# Patient Record
Sex: FEMALE | Race: WHITE | Employment: UNEMPLOYED | URBAN - NONMETROPOLITAN AREA
[De-identification: names, ages, dates, MRNs, and addresses within clinical notes are randomized per-mention and may not be internally consistent; named-entity substitution may affect disease eponyms.]

---

## 2016-10-14 LAB — EXTERNAL GONORRHEA SCREEN: NEGATIVE

## 2016-11-29 LAB
EXTERNAL ABO GROUPING: NORMAL
EXTERNAL RH FACTOR: POSITIVE

## 2016-12-08 LAB
EXTERNAL HEPATITIS B SURFACE ANTIGEN: NEGATIVE
EXTERNAL HERPES CULTURE: NORMAL
EXTERNAL RUBELLA QUALITATIVE: NORMAL
EXTERNAL SYPHILIS RPR SCREEN: NORMAL

## 2017-01-17 DIAGNOSIS — J06.9 ACUTE URI: ICD-10-CM

## 2017-01-17 RX ORDER — IPRATROPIUM BROMIDE 17 UG/1
AEROSOL, METERED RESPIRATORY (INHALATION)
Qty: 12.9 INHALER | Refills: 3 | Status: SHIPPED | OUTPATIENT
Start: 2017-01-17

## 2017-01-25 ENCOUNTER — TELEPHONE (OUTPATIENT)
Dept: NEUROLOGY | Age: 32
End: 2017-01-25

## 2017-01-30 ENCOUNTER — OFFICE VISIT (OUTPATIENT)
Dept: NEUROLOGY | Age: 32
End: 2017-01-30
Payer: COMMERCIAL

## 2017-01-30 VITALS
SYSTOLIC BLOOD PRESSURE: 110 MMHG | WEIGHT: 138 LBS | HEART RATE: 96 BPM | DIASTOLIC BLOOD PRESSURE: 73 MMHG | BODY MASS INDEX: 22.18 KG/M2 | HEIGHT: 66 IN

## 2017-01-30 DIAGNOSIS — R20.0 NUMBNESS AND TINGLING OF RIGHT ARM AND LEG: Primary | ICD-10-CM

## 2017-01-30 DIAGNOSIS — R20.2 NUMBNESS AND TINGLING OF RIGHT ARM AND LEG: Primary | ICD-10-CM

## 2017-01-30 PROCEDURE — 99215 OFFICE O/P EST HI 40 MIN: CPT | Performed by: PSYCHIATRY & NEUROLOGY

## 2017-01-30 RX ORDER — AMOXICILLIN AND CLAVULANATE POTASSIUM 875; 125 MG/1; MG/1
1 TABLET, FILM COATED ORAL 2 TIMES DAILY
COMMUNITY

## 2017-01-30 RX ORDER — DIPHENHYDRAMINE HCL 25 MG
25 CAPSULE ORAL EVERY 6 HOURS PRN
COMMUNITY

## 2017-03-02 ENCOUNTER — TRANSCRIBE ORDERS (OUTPATIENT)
Dept: ADMINISTRATIVE | Facility: HOSPITAL | Age: 32
End: 2017-03-02

## 2017-03-02 DIAGNOSIS — R00.2 PALPITATIONS: Primary | ICD-10-CM

## 2017-03-08 ENCOUNTER — TELEPHONE (OUTPATIENT)
Dept: NEUROLOGY | Age: 32
End: 2017-03-08

## 2017-03-16 ENCOUNTER — HOSPITAL ENCOUNTER (EMERGENCY)
Facility: HOSPITAL | Age: 32
Discharge: HOME | End: 2017-03-16
Attending: EMERGENCY MEDICINE | Admitting: EMERGENCY MEDICINE

## 2017-03-16 ENCOUNTER — APPOINTMENT (OUTPATIENT)
Dept: GENERAL RADIOLOGY | Facility: HOSPITAL | Age: 32
End: 2017-03-16

## 2017-03-16 VITALS
WEIGHT: 145 LBS | RESPIRATION RATE: 18 BRPM | OXYGEN SATURATION: 99 % | TEMPERATURE: 97.8 F | DIASTOLIC BLOOD PRESSURE: 70 MMHG | HEART RATE: 85 BPM | SYSTOLIC BLOOD PRESSURE: 110 MMHG | BODY MASS INDEX: 23.3 KG/M2 | HEIGHT: 66 IN

## 2017-03-16 DIAGNOSIS — E87.6 HYPOKALEMIA: ICD-10-CM

## 2017-03-16 DIAGNOSIS — R00.2 PALPITATIONS: Primary | ICD-10-CM

## 2017-03-16 LAB
ALBUMIN SERPL-MCNC: 3.4 G/DL (ref 3.5–5)
ALBUMIN/GLOB SERPL: 1.2 G/DL (ref 1.1–2.5)
ALP SERPL-CCNC: 60 U/L (ref 24–120)
ALT SERPL W P-5'-P-CCNC: 30 U/L (ref 0–54)
ANION GAP SERPL CALCULATED.3IONS-SCNC: 6 MMOL/L (ref 4–13)
APTT PPP: 29.1 SECONDS (ref 24.1–34.8)
AST SERPL-CCNC: 30 U/L (ref 7–45)
B-HCG UR QL: POSITIVE
BASOPHILS # BLD AUTO: 0.01 10*3/MM3 (ref 0–0.2)
BASOPHILS NFR BLD AUTO: 0.1 % (ref 0–2)
BILIRUB SERPL-MCNC: 0.3 MG/DL (ref 0.1–1)
BILIRUB UR QL STRIP: NEGATIVE
BUN BLD-MCNC: 5 MG/DL (ref 5–21)
BUN/CREAT SERPL: 11.6 (ref 7–25)
CALCIUM SPEC-SCNC: 8.9 MG/DL (ref 8.4–10.4)
CHLORIDE SERPL-SCNC: 104 MMOL/L (ref 98–110)
CLARITY UR: CLEAR
CO2 SERPL-SCNC: 24 MMOL/L (ref 24–31)
COLOR UR: YELLOW
CREAT BLD-MCNC: 0.43 MG/DL (ref 0.5–1.4)
D DIMER PPP FEU-MCNC: 0.45 MG/L (FEU) (ref 0–0.5)
DEPRECATED RDW RBC AUTO: 46 FL (ref 40–54)
EOSINOPHIL # BLD AUTO: 0.17 10*3/MM3 (ref 0–0.7)
EOSINOPHIL NFR BLD AUTO: 2.5 % (ref 0–4)
ERYTHROCYTE [DISTWIDTH] IN BLOOD BY AUTOMATED COUNT: 13.6 % (ref 12–15)
GFR SERPL CREATININE-BSD FRML MDRD: >150 ML/MIN/1.73
GLOBULIN UR ELPH-MCNC: 2.8 GM/DL
GLUCOSE BLD-MCNC: 81 MG/DL (ref 70–100)
GLUCOSE UR STRIP-MCNC: NEGATIVE MG/DL
HCT VFR BLD AUTO: 34.2 % (ref 37–47)
HGB BLD-MCNC: 11.6 G/DL (ref 12–16)
HGB UR QL STRIP.AUTO: NEGATIVE
IMM GRANULOCYTES # BLD: 0.01 10*3/MM3 (ref 0–0.03)
IMM GRANULOCYTES NFR BLD: 0.1 % (ref 0–5)
INR PPP: 0.9 (ref 0.91–1.09)
INTERNAL NEGATIVE CONTROL: NEGATIVE
INTERNAL POSITIVE CONTROL: POSITIVE
KETONES UR QL STRIP: NEGATIVE
LEUKOCYTE ESTERASE UR QL STRIP.AUTO: NEGATIVE
LYMPHOCYTES # BLD AUTO: 1.4 10*3/MM3 (ref 0.72–4.86)
LYMPHOCYTES NFR BLD AUTO: 20.6 % (ref 15–45)
Lab: ABNORMAL
MCH RBC QN AUTO: 31.4 PG (ref 28–32)
MCHC RBC AUTO-ENTMCNC: 33.9 G/DL (ref 33–36)
MCV RBC AUTO: 92.7 FL (ref 82–98)
MONOCYTES # BLD AUTO: 0.31 10*3/MM3 (ref 0.19–1.3)
MONOCYTES NFR BLD AUTO: 4.6 % (ref 4–12)
NEUTROPHILS # BLD AUTO: 4.88 10*3/MM3 (ref 1.87–8.4)
NEUTROPHILS NFR BLD AUTO: 72.1 % (ref 39–78)
NITRITE UR QL STRIP: NEGATIVE
NT-PROBNP SERPL-MCNC: 57.7 PG/ML (ref 0–450)
PH UR STRIP.AUTO: 6.5 [PH] (ref 5–8)
PLATELET # BLD AUTO: 171 10*3/MM3 (ref 130–400)
PMV BLD AUTO: 10.7 FL (ref 6–12)
POTASSIUM BLD-SCNC: 3.3 MMOL/L (ref 3.5–5.3)
PROT SERPL-MCNC: 6.2 G/DL (ref 6.3–8.7)
PROT UR QL STRIP: NEGATIVE
PROTHROMBIN TIME: 12.4 SECONDS (ref 11.9–14.6)
RBC # BLD AUTO: 3.69 10*6/MM3 (ref 4.2–5.4)
SODIUM BLD-SCNC: 134 MMOL/L (ref 135–145)
SP GR UR STRIP: 1.01 (ref 1–1.03)
TROPONIN I SERPL-MCNC: 0 NG/ML (ref 0–0.07)
UROBILINOGEN UR QL STRIP: NORMAL
WBC NRBC COR # BLD: 6.78 10*3/MM3 (ref 4.8–10.8)

## 2017-03-16 PROCEDURE — 93010 ELECTROCARDIOGRAM REPORT: CPT | Performed by: INTERNAL MEDICINE

## 2017-03-16 PROCEDURE — 81003 URINALYSIS AUTO W/O SCOPE: CPT | Performed by: EMERGENCY MEDICINE

## 2017-03-16 PROCEDURE — 85610 PROTHROMBIN TIME: CPT | Performed by: EMERGENCY MEDICINE

## 2017-03-16 PROCEDURE — 93005 ELECTROCARDIOGRAM TRACING: CPT | Performed by: EMERGENCY MEDICINE

## 2017-03-16 PROCEDURE — 71010 HC CHEST PA OR AP: CPT

## 2017-03-16 PROCEDURE — 84484 ASSAY OF TROPONIN QUANT: CPT

## 2017-03-16 PROCEDURE — 85730 THROMBOPLASTIN TIME PARTIAL: CPT | Performed by: EMERGENCY MEDICINE

## 2017-03-16 PROCEDURE — 80053 COMPREHEN METABOLIC PANEL: CPT | Performed by: EMERGENCY MEDICINE

## 2017-03-16 PROCEDURE — 93005 ELECTROCARDIOGRAM TRACING: CPT

## 2017-03-16 PROCEDURE — 85025 COMPLETE CBC W/AUTO DIFF WBC: CPT | Performed by: EMERGENCY MEDICINE

## 2017-03-16 PROCEDURE — 83880 ASSAY OF NATRIURETIC PEPTIDE: CPT | Performed by: EMERGENCY MEDICINE

## 2017-03-16 PROCEDURE — 85379 FIBRIN DEGRADATION QUANT: CPT | Performed by: EMERGENCY MEDICINE

## 2017-03-16 PROCEDURE — 99284 EMERGENCY DEPT VISIT MOD MDM: CPT

## 2017-03-16 RX ORDER — PRENATAL VIT NO.126/IRON/FOLIC 28MG-0.8MG
1 TABLET ORAL DAILY
COMMUNITY

## 2017-03-16 RX ORDER — SODIUM CHLORIDE 0.9 % (FLUSH) 0.9 %
10 SYRINGE (ML) INJECTION AS NEEDED
Status: DISCONTINUED | OUTPATIENT
Start: 2017-03-16 | End: 2017-03-17 | Stop reason: HOSPADM

## 2017-03-16 RX ORDER — POTASSIUM CHLORIDE 20 MEQ/1
40 TABLET, EXTENDED RELEASE ORAL ONCE
Status: DISCONTINUED | OUTPATIENT
Start: 2017-03-16 | End: 2017-03-16 | Stop reason: SDUPTHER

## 2017-03-16 RX ORDER — POTASSIUM CHLORIDE 750 MG/1
40 CAPSULE, EXTENDED RELEASE ORAL ONCE
Status: DISCONTINUED | OUTPATIENT
Start: 2017-03-16 | End: 2017-03-17 | Stop reason: HOSPADM

## 2017-03-16 NOTE — ED NOTES
Amanda reports that she is 22 weeks pregnant and feels that she has been suffering from heart palpations . She reports blurred vision and shortness of breath with these episodes . She reports that she has worn a holter monitor for this and is unaware of the results.      Raad Zuniga RN  03/16/17 [id number]

## 2017-03-16 NOTE — ED PROVIDER NOTES
Subjective palpitations  History of Present Illness  Amanda is a 32 yo white female who since today with chief complaint of palpitations.  The patient states she was sent by Dr. Brown and she is 22 weeks pregnant.  She has been having palpitations for quite some time and is scheduled for Holter monitor.  Apparently about a year ago she was seen by a cardiologist and she states at that time they went to start her on some new medications.  She however at that time elected not to go on medications.  She tells me that over the last several days the palpitations have gotten worse.  Additionally she states that anxiety makes the palpitations worse.  She was seen by her gynecologist and was subsequently referred here for further evaluation of the palpitations.  She has not seen a cardiologist in approximately a year and she denies ever seeing an electrophysiologist.  Review of Systems   Constitutional: Negative.    HENT: Negative.    Eyes: Negative.    Respiratory: Positive for shortness of breath.    Cardiovascular: Positive for palpitations.   Gastrointestinal: Negative.    Endocrine: Negative.    Genitourinary: Negative.    Musculoskeletal: Negative.    Skin: Negative.    Allergic/Immunologic: Negative.    Neurological: Positive for light-headedness.   Hematological: Negative.    Psychiatric/Behavioral: Negative.    All other systems reviewed and are negative.      Past Medical History   Diagnosis Date   • Basal cell carcinoma of cheek 2016     RIGHT CHEEK   • Herpes    • Hypoglycemia    • Nutcracker esophagus    • Pregnant    • Scarring of skin        Allergies   Allergen Reactions   • Shellfish-Derived Products Anaphylaxis   • Cabbage    • Flagyl [Metronidazole]    • Risperidone And Related Myalgia       Past Surgical History   Procedure Laterality Date   • Mole removal       6   • Breast surgery       augmentation   •  section       2   • Cholecystectomy     • [place] tooth extraction         History  reviewed. No pertinent family history.    Social History     Social History   • Marital status: Single     Spouse name: N/A   • Number of children: N/A   • Years of education: N/A     Social History Main Topics   • Smoking status: Former Smoker     Packs/day: 0.00     Types: Cigarettes   • Smokeless tobacco: None   • Alcohol use No   • Drug use: No   • Sexual activity: Yes     Birth control/ protection: None     Other Topics Concern   • None     Social History Narrative   • None           Objective   Physical Exam   Constitutional: She is oriented to person, place, and time. She appears well-developed and well-nourished.   HENT:   Head: Normocephalic and atraumatic.   Right Ear: External ear normal.   Left Ear: External ear normal.   Nose: Nose normal.   Mouth/Throat: Oropharynx is clear and moist.   Eyes: Conjunctivae and EOM are normal. Pupils are equal, round, and reactive to light. Right eye exhibits no discharge. Left eye exhibits no discharge.   Neck: Normal range of motion. Neck supple. No thyromegaly present.   Cardiovascular: Normal rate, regular rhythm, normal heart sounds and intact distal pulses.  Exam reveals no friction rub.    No murmur heard.  Pulmonary/Chest: Effort normal and breath sounds normal. No respiratory distress.   Abdominal: Soft. Bowel sounds are normal. She exhibits no distension. There is no tenderness.   Musculoskeletal: Normal range of motion. She exhibits no edema or deformity.   Neurological: She is alert and oriented to person, place, and time. She has normal reflexes. No cranial nerve deficit.   Skin: Skin is warm and dry. No rash noted.   Psychiatric: Judgment normal.   Nursing note and vitals reviewed.      Procedures         ED Course  ED Course   Comment By Time   The pt has continued to have palpitations.  However the monitor is not bearing out the symptoms.   My plan is to discharge her with a Holter Monitor.   Meryl Hobbs MD 03/16 2009   I spoke with the patient  again she is concerned that she might die from a heart attack.    I tried to assure her that i don't think that will happen.  I have recommended that she follow up with the EP MD Meryl Hobbs MD 03/16 2045          ddx palpitations: arrhythmia, valvular regurgitation, thyrotoxicosis, anemia, anxiety.        MDM  Number of Diagnoses or Management Options  Hypokalemia: new and requires workup  Palpitations: new and requires workup     Amount and/or Complexity of Data Reviewed  Clinical lab tests: ordered and reviewed  Tests in the radiology section of CPT®: ordered and reviewed    Risk of Complications, Morbidity, and/or Mortality  Presenting problems: moderate  Diagnostic procedures: moderate  Management options: moderate    Patient Progress  Patient progress: stable      Final diagnoses:   Palpitations   Hypokalemia            Meryl Hobbs MD  03/17/17 2120

## 2017-03-16 NOTE — ED NOTES
Amanda reports chest pain and increased dizziness. Dr. Hobbs notified. Orders received.       Raad Zuniga RN  03/16/17 [id number]

## 2017-03-17 NOTE — ED NOTES
PT WAS AMBULATED DOWN THE HALLWAY AND BACK WITH RN WHILE MONITORED BY TELEMETRY BOX. PT REMAINED IN A NORMAL SINUS RHYTHM WITH AN AVERAGE HEART RATE OF 75 BPM DURING. PT COMPLAINED OF INTERMITTENT LIGHTHEADEDNESS THAT COMES AND GOES REGARDLESS OF ACTIVITY.      Ghislaine Gan RN  03/16/17 2023

## 2017-03-17 NOTE — DISCHARGE INSTRUCTIONS
Please bring the Holter back as scheduled.  If your symptoms worsen or you are unable to follow-up with the cardiology group I do recommend that you return to the emergency room medially for reevaluation.  Please call the cardiology group tomorrow and attempt to get an earlier appointment.

## 2017-03-21 ENCOUNTER — HOSPITAL ENCOUNTER (OUTPATIENT)
Dept: CARDIOLOGY | Facility: HOSPITAL | Age: 32
Discharge: HOME | End: 2017-03-21
Attending: OBSTETRICS & GYNECOLOGY | Admitting: OBSTETRICS & GYNECOLOGY

## 2017-03-21 DIAGNOSIS — R00.2 PALPITATIONS: ICD-10-CM

## 2017-03-21 PROCEDURE — 93226 XTRNL ECG REC<48 HR SCAN A/R: CPT

## 2017-03-21 PROCEDURE — 93225 XTRNL ECG REC<48 HRS REC: CPT

## 2017-03-24 PROCEDURE — 93227 XTRNL ECG REC<48 HR R&I: CPT | Performed by: INTERNAL MEDICINE

## 2017-04-13 LAB — EXTERNAL ANTIBODY SCREEN: NEGATIVE

## 2017-06-22 ENCOUNTER — HOSPITAL ENCOUNTER (OUTPATIENT)
Facility: HOSPITAL | Age: 32
Setting detail: OBSERVATION
Discharge: HOME | End: 2017-06-22
Attending: OBSTETRICS & GYNECOLOGY | Admitting: OBSTETRICS & GYNECOLOGY

## 2017-06-22 VITALS
WEIGHT: 167 LBS | RESPIRATION RATE: 18 BRPM | DIASTOLIC BLOOD PRESSURE: 68 MMHG | BODY MASS INDEX: 26.84 KG/M2 | HEIGHT: 66 IN | HEART RATE: 100 BPM | TEMPERATURE: 97.8 F | SYSTOLIC BLOOD PRESSURE: 123 MMHG

## 2017-06-22 LAB
EXTERNAL CHLAMYDIA SCREEN: NEGATIVE
EXTERNAL GROUP B STREP ANTIGEN: NEGATIVE

## 2017-06-22 PROCEDURE — G0378 HOSPITAL OBSERVATION PER HR: HCPCS

## 2017-06-22 PROCEDURE — G0463 HOSPITAL OUTPT CLINIC VISIT: HCPCS

## 2017-06-22 PROCEDURE — 59025 FETAL NON-STRESS TEST: CPT

## 2017-06-22 RX ORDER — ACETAMINOPHEN 325 MG/1
650 TABLET ORAL EVERY 4 HOURS PRN
Status: DISCONTINUED | OUTPATIENT
Start: 2017-06-22 | End: 2017-06-22 | Stop reason: HOSPADM

## 2017-06-22 RX ORDER — ACETAMINOPHEN 325 MG/1
650 TABLET ORAL EVERY 4 HOURS PRN
Status: CANCELLED | OUTPATIENT
Start: 2017-06-22

## 2017-06-22 RX ORDER — LORATADINE 10 MG/1
CAPSULE, LIQUID FILLED ORAL DAILY
COMMUNITY

## 2017-06-22 NOTE — NON STRESS TEST
Amanda PAGAN Kennedy, a  at 36w3d with an DILIP of 2017, by Patient Reported, was seen at [facility] for a nonstress test.    Chief Complaint   Patient presents with   • Non-stress Test     contractions       Interpretation A  Nonstress Test Interpretation A: Reactive (17 : Kaleigh Brown MD)  Comments A: verified per twan washburn/ twan razo (17 : Ani Gillis RN)

## 2017-06-23 PROBLEM — Z34.90 PREGNANCY: Status: ACTIVE | Noted: 2017-06-23

## 2017-07-11 ENCOUNTER — APPOINTMENT (OUTPATIENT)
Dept: PREADMISSION TESTING | Facility: HOSPITAL | Age: 32
End: 2017-07-11

## 2017-07-12 ENCOUNTER — HOSPITAL ENCOUNTER (OUTPATIENT)
Facility: HOSPITAL | Age: 32
Setting detail: OBSERVATION
Discharge: HOME | End: 2017-07-12
Attending: OBSTETRICS & GYNECOLOGY | Admitting: OBSTETRICS & GYNECOLOGY

## 2017-07-12 VITALS — HEIGHT: 66 IN | BODY MASS INDEX: 26.52 KG/M2 | WEIGHT: 165 LBS

## 2017-07-12 PROCEDURE — G0378 HOSPITAL OBSERVATION PER HR: HCPCS

## 2017-07-12 NOTE — NURSING NOTE
Pt presented to labor and delivery for vaginal bleeding, pt stated it started about 0900, a quarter size amount was noted on her pad she wore in, dark brown old blood.

## 2017-07-12 NOTE — NURSING NOTE
Pt up to bathroom to void at 1310, very small amount of dark red blood noted on chux pad, pt voided,new pad placed

## 2017-07-12 NOTE — NURSING NOTE
Pt up to bathroom to void at 1215, very small amount of dark red blood noted on chux pad, new pad placed.

## 2017-07-13 ENCOUNTER — APPOINTMENT (OUTPATIENT)
Dept: PREADMISSION TESTING | Facility: HOSPITAL | Age: 32
End: 2017-07-13

## 2017-07-13 VITALS
DIASTOLIC BLOOD PRESSURE: 74 MMHG | SYSTOLIC BLOOD PRESSURE: 122 MMHG | BODY MASS INDEX: 25.96 KG/M2 | WEIGHT: 165.4 LBS | HEART RATE: 85 BPM | OXYGEN SATURATION: 100 % | RESPIRATION RATE: 20 BRPM | HEIGHT: 67 IN

## 2017-07-13 DIAGNOSIS — O34.211 MATERNAL CARE DUE TO LOW TRANSVERSE UTERINE SCAR FROM PREVIOUS CESAREAN DELIVERY: ICD-10-CM

## 2017-07-13 DIAGNOSIS — O34.211 MATERNAL CARE DUE TO LOW TRANSVERSE UTERINE SCAR FROM PREVIOUS CESAREAN DELIVERY: Primary | ICD-10-CM

## 2017-07-13 LAB
ABO GROUP BLD: NORMAL
ANION GAP SERPL CALCULATED.3IONS-SCNC: 8 MMOL/L (ref 4–13)
BASOPHILS # BLD AUTO: 0.02 10*3/MM3 (ref 0–0.2)
BASOPHILS NFR BLD AUTO: 0.2 % (ref 0–2)
BILIRUB UR QL STRIP: NEGATIVE
BLD GP AB SCN SERPL QL: NEGATIVE
BUN BLD-MCNC: 5 MG/DL (ref 5–21)
BUN/CREAT SERPL: 10.4 (ref 7–25)
CALCIUM SPEC-SCNC: 8.7 MG/DL (ref 8.4–10.4)
CHLORIDE SERPL-SCNC: 106 MMOL/L (ref 98–110)
CLARITY UR: CLEAR
CO2 SERPL-SCNC: 21 MMOL/L (ref 24–31)
COLOR UR: YELLOW
CREAT BLD-MCNC: 0.48 MG/DL (ref 0.5–1.4)
DEPRECATED RDW RBC AUTO: 46.3 FL (ref 40–54)
EOSINOPHIL # BLD AUTO: 0.07 10*3/MM3 (ref 0–0.7)
EOSINOPHIL NFR BLD AUTO: 0.8 % (ref 0–4)
ERYTHROCYTE [DISTWIDTH] IN BLOOD BY AUTOMATED COUNT: 14.2 % (ref 12–15)
GFR SERPL CREATININE-BSD FRML MDRD: >150 ML/MIN/1.73
GLUCOSE BLD-MCNC: 76 MG/DL (ref 70–100)
GLUCOSE UR STRIP-MCNC: NEGATIVE MG/DL
HCT VFR BLD AUTO: 34.3 % (ref 37–47)
HGB BLD-MCNC: 11.1 G/DL (ref 12–16)
HGB UR QL STRIP.AUTO: ABNORMAL
IMM GRANULOCYTES # BLD: 0.03 10*3/MM3 (ref 0–0.03)
IMM GRANULOCYTES NFR BLD: 0.4 % (ref 0–5)
KETONES UR QL STRIP: NEGATIVE
LEUKOCYTE ESTERASE UR QL STRIP.AUTO: NEGATIVE
LYMPHOCYTES # BLD AUTO: 1.76 10*3/MM3 (ref 0.72–4.86)
LYMPHOCYTES NFR BLD AUTO: 20.6 % (ref 15–45)
MCH RBC QN AUTO: 29.2 PG (ref 28–32)
MCHC RBC AUTO-ENTMCNC: 32.4 G/DL (ref 33–36)
MCV RBC AUTO: 90.3 FL (ref 82–98)
MONOCYTES # BLD AUTO: 0.42 10*3/MM3 (ref 0.19–1.3)
MONOCYTES NFR BLD AUTO: 4.9 % (ref 4–12)
NEUTROPHILS # BLD AUTO: 6.26 10*3/MM3 (ref 1.87–8.4)
NEUTROPHILS NFR BLD AUTO: 73.1 % (ref 39–78)
NITRITE UR QL STRIP: NEGATIVE
PH UR STRIP.AUTO: 7 [PH] (ref 5–8)
PLATELET # BLD AUTO: 235 10*3/MM3 (ref 130–400)
PMV BLD AUTO: 11.4 FL (ref 6–12)
POTASSIUM BLD-SCNC: 3.9 MMOL/L (ref 3.5–5.3)
PROT UR QL STRIP: NEGATIVE
RBC # BLD AUTO: 3.8 10*6/MM3 (ref 4.2–5.4)
RH BLD: POSITIVE
SODIUM BLD-SCNC: 135 MMOL/L (ref 135–145)
SP GR UR STRIP: 1.01 (ref 1–1.03)
UROBILINOGEN UR QL STRIP: ABNORMAL
WBC NRBC COR # BLD: 8.56 10*3/MM3 (ref 4.8–10.8)

## 2017-07-13 RX ORDER — CARBOPROST TROMETHAMINE 250 UG/ML
250 INJECTION, SOLUTION INTRAMUSCULAR AS NEEDED
Status: CANCELLED | OUTPATIENT
Start: 2017-07-13

## 2017-07-13 RX ORDER — PROMETHAZINE HYDROCHLORIDE 25 MG/1
12.5 TABLET ORAL EVERY 6 HOURS PRN
Status: CANCELLED | OUTPATIENT
Start: 2017-07-13

## 2017-07-13 RX ORDER — IBUPROFEN 800 MG/1
800 TABLET ORAL EVERY 8 HOURS PRN
Status: CANCELLED | OUTPATIENT
Start: 2017-07-13

## 2017-07-13 RX ORDER — MISOPROSTOL 200 UG/1
800 TABLET ORAL AS NEEDED
Status: CANCELLED | OUTPATIENT
Start: 2017-07-13

## 2017-07-13 RX ORDER — PROMETHAZINE HYDROCHLORIDE 25 MG/ML
12.5 INJECTION, SOLUTION INTRAMUSCULAR; INTRAVENOUS EVERY 6 HOURS PRN
Status: CANCELLED | OUTPATIENT
Start: 2017-07-13

## 2017-07-13 RX ORDER — PROMETHAZINE HYDROCHLORIDE 12.5 MG/1
12.5 SUPPOSITORY RECTAL EVERY 6 HOURS PRN
Status: CANCELLED | OUTPATIENT
Start: 2017-07-13

## 2017-07-13 RX ORDER — SODIUM CHLORIDE 0.9 % (FLUSH) 0.9 %
1-10 SYRINGE (ML) INJECTION AS NEEDED
Status: CANCELLED | OUTPATIENT
Start: 2017-07-13

## 2017-07-13 RX ORDER — METHYLERGONOVINE MALEATE 0.2 MG/ML
200 INJECTION INTRAVENOUS AS NEEDED
Status: CANCELLED | OUTPATIENT
Start: 2017-07-13

## 2017-07-13 RX ORDER — CLONAZEPAM 0.5 MG/1
0.25 TABLET ORAL 4 TIMES DAILY PRN
COMMUNITY
End: 2017-07-17 | Stop reason: HOSPADM

## 2017-07-13 RX ORDER — HYDROCODONE BITARTRATE AND ACETAMINOPHEN 5; 325 MG/1; MG/1
1 TABLET ORAL EVERY 4 HOURS PRN
Status: CANCELLED | OUTPATIENT
Start: 2017-07-13 | End: 2017-07-23

## 2017-07-13 NOTE — DISCHARGE INSTRUCTIONS
DAY OF SURGERY INSTRUCTIONS        YOUR SURGEON: Kevin    PROCEDURE:     DATE OF SURGERY: 2017    ARRIVAL TIME: AS DIRECTED BY OFFICE    DAY OF SURGERY TAKE ONLY THESE MEDICATIONS: NO NOT TAKE ANY MEDICATIONS THE MORNING OF YOUR SURGERY.         BEFORE YOU COME TO THE HOSPITAL  (Pre-op instructions)  • Do not eat, drink, smoke or chew gum after midnight the night before surgery.  This also includes no mints.  • Morning of surgery take only the medicines you have been instructed with a sip of water unless otherwise instructed  by your physician.  • Do not shave, wear makeup or dark nail polish.  • Remove all jewelry including rings.  • Leave anything you consider valuable at home.  • Leave your suitcase in the car until after your surgery.  • Bring the following with you if applicable:  o Picture ID and insurance, Medicare or Medicaid cards  o Co-pay/deductible required by insurance (cash, check, credit card)  o Copy of advance directive, living will or power-of- documents if not brought to PAT  o CPAP or BIPAP mask and tubing  o Relaxation aids (MP3 player, book, magazine)  • Confirm your arrival time with you surgeon the day before your surgery (surgery times are subject to change)  • On the day of surgery check in at registration located at the main entrance of the hospital.       Outpatient Surgery Guidelines, Adult  Outpatient procedures are those for which the person having the procedure is allowed to go home the same day as the procedure. Various procedures are done on an outpatient basis. You should follow some general guidelines if you will be having an outpatient procedure.  LET YOUR HEALTH CARE PROVIDER KNOW ABOUT:  · Any allergies you have.  · All medicines you are taking, including vitamins, herbs, eye drops, creams, and over-the-counter medicines.  · Previous problems you or members of your family have had with the use of anesthetics.  · Any blood disorders you  have.  · Previous surgeries you have had.  · Medical conditions you have.  RISKS AND COMPLICATIONS  Your health care provider will discuss possible risks and complications with you before surgery. Common risks and complications include:    · Problems due to the use of anesthetics.  · Blood loss and replacement (does not apply to minor surgical procedures).  · Temporary increase in pain due to surgery.  · Uncorrected pain or problems that the surgery was meant to correct.  · Infection.  · New damage.  BEFORE THE PROCEDURE  · Ask your health care provider about changing or stopping your regular medicines. You may need to stop taking certain medicines in the days or weeks before the procedure.  · Stop smoking at least 2 weeks before surgery. This lowers your risk for complications during and after surgery. Ask your health care provider for help with this if needed.  · Eat your usual meals and a light supper the day before surgery. Continue fluid intake. Do not drink alcohol.  · Do not eat or drink after midnight the night before your surgery.   · Arrange for someone to take you home and to stay with you for 24 hours after the procedure. Medicine given for your procedure may affect your ability to drive or to care for yourself.  · Call your health care provider's office if you develop an illness or problem that may prevent you from safely having your procedure.  AFTER THE PROCEDURE  After surgery, you will be taken to a recovery area, where your progress will be monitored. If there are no complications, you will be allowed to go home when you are awake, stable, and taking fluids well. You may have numbness around the surgical site. Healing will take some time. You will have tenderness at the surgical site and may have some swelling and bruising. You may also have some nausea.  HOME CARE INSTRUCTIONS  · Do not drive for 24 hours, or as directed by your health care provider. Do not drive while taking prescription pain  medicines.  · Do not drink alcohol for 24 hours.  · Do not make important decisions or sign legal documents for 24 hours.  · You may resume a normal diet and activities as directed.  · Do not lift anything heavier than 10 pounds (4.5 kg) or play contact sports until your health care provider says it is okay.  · Change your bandages (dressings) as directed.  · Only take over-the-counter or prescription medicines as directed by your health care provider.  · Follow up with your health care provider as directed.  SEEK MEDICAL CARE IF:  · You have increased bleeding (more than a small spot) from the surgical site.  · You have redness, swelling, or increasing pain in the wound.  · You see pus coming from the wound.  · You have a fever.  · You notice a bad smell coming from the wound or dressing.  · You feel lightheaded or faint.  · You develop a rash.  · You have trouble breathing.  · You develop allergies.  MAKE SURE YOU:  · Understand these instructions.  · Will watch your condition.  · Will get help right away if you are not doing well or get worse.     This information is not intended to replace advice given to you by your health care provider. Make sure you discuss any questions you have with your health care provider.     Document Released: 09/12/2002 Document Revised: 05/03/2016 Document Reviewed: 05/22/2014  [employer] Interactive Patient Education ©2016 [employer]       Fall Prevention in Hospitals, Adult  As a hospital patient, your condition and the treatments you receive can increase your risk for falls. Some additional risk factors for falls in a hospital include:  · Being in an unfamiliar environment.  · Being on bed rest.  · Your surgery.  · Taking certain medicines.  · Your tubing requirements, such as intravenous (IV) therapy or catheters.  It is important that you learn how to decrease fall risks while at the hospital. Below are important tips that can help prevent falls.  SAFETY TIPS FOR PREVENTING  FALLS  Talk about your risk of falling.  · Ask your health care provider why you are at risk for falling. Is it your medicine, illness, tubing placement, or something else?  · Make a plan with your health care provider to keep you safe from falls.  · Ask your health care provider or pharmacist about side effects of your medicines. Some medicines can make you dizzy or affect your coordination.  Ask for help.  · Ask for help before getting out of bed. You may need to press your call button.  · Ask for assistance in getting safely to the toilet.  · Ask for a walker or cane to be put at your bedside. Ask that most of the side rails on your bed be placed up before your health care provider leaves the room.  · Ask family or friends to sit with you.  · Ask for things that are out of your reach, such as your glasses, hearing aids, telephone, bedside table, or call button.  Follow these tips to avoid falling:  · Stay lying or seated, rather than standing, while waiting for help.  · Wear rubber-soled slippers or shoes whenever you walk in the hospital.  · Avoid quick, sudden movements.  ¨ Change positions slowly.  ¨ Sit on the side of your bed before standing.  ¨ Stand up slowly and wait before you start to walk.  · Let your health care provider know if there is a spill on the floor.  · Pay careful attention to the medical equipment, electrical cords, and tubes around you.  · When you need help, use your call button by your bed or in the bathroom. Wait for one of your health care providers to help you.  · If you feel dizzy or unsure of your footing, return to bed and wait for assistance.  · Avoid being distracted by the TV, telephone, or another person in your room.  · Do not lean or support yourself on rolling objects, such as IV poles or bedside tables.     This information is not intended to replace advice given to you by your health care provider. Make sure you discuss any questions you have with your health care  provider.     Document Released: 12/15/2001 Document Revised: 01/08/2016 Document Reviewed: 08/25/2013  [employer] Interactive Patient Education ©2016 [employer]       Surgical Site Infections FAQs  What is a Surgical Site Infection (SSI)?  A surgical site infection is an infection that occurs after surgery in the part of the body where the surgery took place. Most patients who have surgery do not develop an infection. However, infections develop in about 1 to 3 out of every 100 patients who have surgery.  Some of the common symptoms of a surgical site infection are:  · Redness and pain around the area where you had surgery  · Drainage of cloudy fluid from your surgical wound  · Fever  Can SSIs be treated?  Yes. Most surgical site infections can be treated with antibiotics. The antibiotic given to you depends on the bacteria (germs) causing the infection. Sometimes patients with SSIs also need another surgery to treat the infection.  What are some of the things that hospitals are doing to prevent SSIs?  To prevent SSIs, doctors, nurses, and other healthcare providers:  · Clean their hands and arms up to their elbows with an antiseptic agent just before the surgery.  · Clean their hands with soap and water or an alcohol-based hand rub before and after caring for each patient.  · May remove some of your hair immediately before your surgery using electric clippers if the hair is in the same area where the procedure will occur. They should not shave you with a razor.  · Wear special hair covers, masks, gowns, and gloves during surgery to keep the surgery area clean.  · Give you antibiotics before your surgery starts. In most cases, you should get antibiotics within 60 minutes before the surgery starts and the antibiotics should be stopped within 24 hours after surgery.  · Clean the skin at the site of your surgery with a special soap that kills germs.  What can I do to help prevent SSIs?  Before your surgery:  · Tell  your doctor about other medical problems you may have. Health problems such as allergies, diabetes, and obesity could affect your surgery and your treatment.  · Quit smoking. Patients who smoke get more infections. Talk to your doctor about how you can quit before your surgery.  · Do not shave near where you will have surgery. Shaving with a razor can irritate your skin and make it easier to develop an infection.  At the time of your surgery:  · Speak up if someone tries to shave you with a razor before surgery. Ask why you need to be shaved and talk with your surgeon if you have any concerns.  · Ask if you will get antibiotics before surgery.  After your surgery:  · Make sure that your healthcare providers clean their hands before examining you, either with soap and water or an alcohol-based hand rub.  · If you do not see your providers clean their hands, please ask them to do so.  · Family and friends who visit you should not touch the surgical wound or dressings.  · Family and friends should clean their hands with soap and water or an alcohol-based hand rub before and after visiting you. If you do not see them clean their hands, ask them to clean their hands.  What do I need to do when I go home from the hospital?  · Before you go home, your doctor or nurse should explain everything you need to know about taking care of your wound. Make sure you understand how to care for your wound before you leave the hospital.  · Always clean your hands before and after caring for your wound.  · Before you go home, make sure you know who to contact if you have questions or problems after you get home.  · If you have any symptoms of an infection, such as redness and pain at the surgery site, drainage, or fever, call your doctor immediately.  If you have additional questions, please ask your doctor or nurse.  Developed and co-sponsored by The Society for Healthcare Epidemiology of [place] (SHEA); Infectious Diseases Society of  [place] (IDSA); American Hospital Association; Association for Professionals in Infection Control and Epidemiology (APIC); Centers for Disease Control and Prevention (CDC); and The Joint Commission.     This information is not intended to replace advice given to you by your health care provider. Make sure you discuss any questions you have with your health care provider.     Document Released: 12/23/2014 Document Revised: 01/08/2016 Document Reviewed: 03/02/2016  [employer] Interactive Patient Education ©2016 [employer]       [facility]  CHG 4% Patient Instruction Sheet    Preparing the Skin Before Surgery  Preparing or “prepping” skin before surgery can reduce the risk of infection at the surgical site. To make the process easier,[facility] has chosen 4% Chlorhexidine Gluconate (CHG) antiseptic solution.   The steps below outline the prepping process and should be carefully followed.                                                                                                                                                      Prep the skin at the following time(s):                                                      We recommend you shower the night before surgery, and again the morning of surgery with the 4% CHG antiseptic solution using half of the bottle and a cloth each time.  Dress in clean clothes/sleepwear after showering.  See instructions below for application.          Do not apply any lotions or moisturizers.       Do not shave the area to be prepped for at least 2 days prior to surgery.    Clipping the hair may be done immediately prior to your surgery at the hospital    if needed.    Directions:  Thoroughly rinse your body with water.  Apply 4% CHG to a cloth and wash skin gently, paying special attention to the operative site.  Rinse again thoroughly.  Once you have begun using this product do not apply anything else to your skin. If itching or redness persists, rinse affected areas and  discontinue use.    When using this product:  • Keep out of eyes, ears, and mouth.  • If solution should contact these areas, rinse out promptly and thoroughly with water.  • For external use only.  • Do not use in genital area, on your face or head.      PATIENT/FAMILY/RESPONSIBLE PARTY VERBALIZES UNDERSTANDING OF ABOVE EDUCATION.  COPY OF PAIN SCALE GIVEN AND REVIEWED WITH VERBALIZED UNDERSTANDING.

## 2017-07-14 ENCOUNTER — ANESTHESIA EVENT (OUTPATIENT)
Dept: LABOR AND DELIVERY | Facility: HOSPITAL | Age: 32
End: 2017-07-14

## 2017-07-14 ENCOUNTER — HOSPITAL ENCOUNTER (INPATIENT)
Facility: HOSPITAL | Age: 32
LOS: 3 days | Discharge: HOME | End: 2017-07-17
Attending: OBSTETRICS & GYNECOLOGY | Admitting: OBSTETRICS & GYNECOLOGY

## 2017-07-14 ENCOUNTER — ANESTHESIA (OUTPATIENT)
Dept: LABOR AND DELIVERY | Facility: HOSPITAL | Age: 32
End: 2017-07-14

## 2017-07-14 PROBLEM — O34.211 MATERNAL CARE DUE TO LOW TRANSVERSE UTERINE SCAR FROM PREVIOUS CESAREAN DELIVERY: Status: ACTIVE | Noted: 2017-07-14

## 2017-07-14 LAB — GLUCOSE BLDC GLUCOMTR-MCNC: 104 MG/DL (ref 70–130)

## 2017-07-14 PROCEDURE — 25010000003 CEFAZOLIN PER 500 MG: Performed by: OBSTETRICS & GYNECOLOGY

## 2017-07-14 PROCEDURE — 25010000002 HYDROMORPHONE PER 4 MG: Performed by: NURSE ANESTHETIST, CERTIFIED REGISTERED

## 2017-07-14 PROCEDURE — 88307 TISSUE EXAM BY PATHOLOGIST: CPT | Performed by: OBSTETRICS & GYNECOLOGY

## 2017-07-14 PROCEDURE — 25010000002 KETOROLAC TROMETHAMINE PER 15 MG: Performed by: OBSTETRICS & GYNECOLOGY

## 2017-07-14 PROCEDURE — 51703 INSERT BLADDER CATH COMPLEX: CPT

## 2017-07-14 PROCEDURE — 25010000002 ONDANSETRON PER 1 MG: Performed by: NURSE ANESTHETIST, CERTIFIED REGISTERED

## 2017-07-14 PROCEDURE — 25010000002 NALBUPHINE PER 10 MG: Performed by: NURSE ANESTHETIST, CERTIFIED REGISTERED

## 2017-07-14 PROCEDURE — 25010000002 DEXAMETHASONE PER 1 MG: Performed by: NURSE ANESTHETIST, CERTIFIED REGISTERED

## 2017-07-14 PROCEDURE — 94799 UNLISTED PULMONARY SVC/PX: CPT

## 2017-07-14 PROCEDURE — 25010000002 BUTORPHANOL PER 1 MG: Performed by: NURSE ANESTHETIST, CERTIFIED REGISTERED

## 2017-07-14 PROCEDURE — 25010000003 CEFAZOLIN PER 500 MG: Performed by: NURSE ANESTHETIST, CERTIFIED REGISTERED

## 2017-07-14 PROCEDURE — G0463 HOSPITAL OUTPT CLINIC VISIT: HCPCS

## 2017-07-14 PROCEDURE — 25010000002 CEFEPIME: Performed by: OBSTETRICS & GYNECOLOGY

## 2017-07-14 PROCEDURE — 25010000002 MIDAZOLAM PER 1 MG: Performed by: NURSE ANESTHETIST, CERTIFIED REGISTERED

## 2017-07-14 PROCEDURE — 82962 GLUCOSE BLOOD TEST: CPT

## 2017-07-14 PROCEDURE — C1765 ADHESION BARRIER: HCPCS | Performed by: OBSTETRICS & GYNECOLOGY

## 2017-07-14 PROCEDURE — 25010000002 KETOROLAC TROMETHAMINE PER 15 MG: Performed by: NURSE ANESTHETIST, CERTIFIED REGISTERED

## 2017-07-14 RX ORDER — NALBUPHINE HCL 10 MG/ML
2.5 AMPUL (ML) INJECTION EVERY 4 HOURS PRN
Status: DISPENSED | OUTPATIENT
Start: 2017-07-14 | End: 2017-07-15

## 2017-07-14 RX ORDER — PROMETHAZINE HYDROCHLORIDE 25 MG/1
12.5 TABLET ORAL EVERY 6 HOURS PRN
Status: DISCONTINUED | OUTPATIENT
Start: 2017-07-14 | End: 2017-07-14 | Stop reason: HOSPADM

## 2017-07-14 RX ORDER — SODIUM CHLORIDE, SODIUM LACTATE, POTASSIUM CHLORIDE, CALCIUM CHLORIDE 600; 310; 30; 20 MG/100ML; MG/100ML; MG/100ML; MG/100ML
125 INJECTION, SOLUTION INTRAVENOUS CONTINUOUS
Status: DISCONTINUED | OUTPATIENT
Start: 2017-07-14 | End: 2017-07-14

## 2017-07-14 RX ORDER — BUTORPHANOL TARTRATE 1 MG/ML
0.5 INJECTION, SOLUTION INTRAMUSCULAR; INTRAVENOUS EVERY 6 HOURS PRN
Status: DISCONTINUED | OUTPATIENT
Start: 2017-07-14 | End: 2017-07-14

## 2017-07-14 RX ORDER — ONDANSETRON 2 MG/ML
4 INJECTION INTRAMUSCULAR; INTRAVENOUS EVERY 6 HOURS PRN
Status: DISCONTINUED | OUTPATIENT
Start: 2017-07-14 | End: 2017-07-17 | Stop reason: HOSPADM

## 2017-07-14 RX ORDER — PROMETHAZINE HYDROCHLORIDE 12.5 MG/1
12.5 SUPPOSITORY RECTAL EVERY 6 HOURS PRN
Status: DISCONTINUED | OUTPATIENT
Start: 2017-07-14 | End: 2017-07-17 | Stop reason: HOSPADM

## 2017-07-14 RX ORDER — SODIUM CHLORIDE 0.9 % (FLUSH) 0.9 %
1-10 SYRINGE (ML) INJECTION AS NEEDED
Status: DISCONTINUED | OUTPATIENT
Start: 2017-07-14 | End: 2017-07-14 | Stop reason: HOSPADM

## 2017-07-14 RX ORDER — PROMETHAZINE HYDROCHLORIDE 25 MG/ML
12.5 INJECTION, SOLUTION INTRAMUSCULAR; INTRAVENOUS EVERY 6 HOURS PRN
Status: DISCONTINUED | OUTPATIENT
Start: 2017-07-14 | End: 2017-07-14 | Stop reason: HOSPADM

## 2017-07-14 RX ORDER — LAMOTRIGINE 100 MG/1
100 TABLET ORAL EVERY 12 HOURS SCHEDULED
Status: DISCONTINUED | OUTPATIENT
Start: 2017-07-14 | End: 2017-07-17 | Stop reason: HOSPADM

## 2017-07-14 RX ORDER — PRENATAL VIT/IRON FUM/FOLIC AC 27MG-0.8MG
1 TABLET ORAL DAILY
Status: DISCONTINUED | OUTPATIENT
Start: 2017-07-14 | End: 2017-07-17 | Stop reason: HOSPADM

## 2017-07-14 RX ORDER — ONDANSETRON 2 MG/ML
4 INJECTION INTRAMUSCULAR; INTRAVENOUS EVERY 6 HOURS PRN
Status: DISCONTINUED | OUTPATIENT
Start: 2017-07-14 | End: 2017-07-14 | Stop reason: SDUPTHER

## 2017-07-14 RX ORDER — OXYCODONE AND ACETAMINOPHEN 7.5; 325 MG/1; MG/1
1 TABLET ORAL EVERY 4 HOURS PRN
Status: DISCONTINUED | OUTPATIENT
Start: 2017-07-14 | End: 2017-07-14

## 2017-07-14 RX ORDER — OXYCODONE AND ACETAMINOPHEN 7.5; 325 MG/1; MG/1
2 TABLET ORAL EVERY 4 HOURS PRN
Status: ACTIVE | OUTPATIENT
Start: 2017-07-14 | End: 2017-07-15

## 2017-07-14 RX ORDER — SODIUM CHLORIDE 0.9 % (FLUSH) 0.9 %
1-10 SYRINGE (ML) INJECTION AS NEEDED
Status: DISCONTINUED | OUTPATIENT
Start: 2017-07-14 | End: 2017-07-17 | Stop reason: HOSPADM

## 2017-07-14 RX ORDER — FAMOTIDINE 10 MG/ML
20 INJECTION, SOLUTION INTRAVENOUS ONCE AS NEEDED
Status: DISCONTINUED | OUTPATIENT
Start: 2017-07-14 | End: 2017-07-14

## 2017-07-14 RX ORDER — IBUPROFEN 800 MG/1
800 TABLET ORAL EVERY 8 HOURS PRN
Status: DISCONTINUED | OUTPATIENT
Start: 2017-07-14 | End: 2017-07-14 | Stop reason: HOSPADM

## 2017-07-14 RX ORDER — OXYCODONE HYDROCHLORIDE AND ACETAMINOPHEN 5; 325 MG/1; MG/1
2 TABLET ORAL EVERY 6 HOURS PRN
Status: DISCONTINUED | OUTPATIENT
Start: 2017-07-15 | End: 2017-07-17 | Stop reason: HOSPADM

## 2017-07-14 RX ORDER — ONDANSETRON 4 MG/1
4 TABLET, FILM COATED ORAL EVERY 6 HOURS PRN
Status: DISCONTINUED | OUTPATIENT
Start: 2017-07-14 | End: 2017-07-17 | Stop reason: HOSPADM

## 2017-07-14 RX ORDER — DIPHENHYDRAMINE HCL 25 MG
25 CAPSULE ORAL EVERY 4 HOURS PRN
Status: DISCONTINUED | OUTPATIENT
Start: 2017-07-14 | End: 2017-07-17 | Stop reason: HOSPADM

## 2017-07-14 RX ORDER — CETIRIZINE HYDROCHLORIDE 10 MG/1
10 TABLET ORAL DAILY
Status: COMPLETED | OUTPATIENT
Start: 2017-07-14 | End: 2017-07-14

## 2017-07-14 RX ORDER — NALBUPHINE HCL 10 MG/ML
2.5 AMPUL (ML) INJECTION EVERY 4 HOURS PRN
Status: DISCONTINUED | OUTPATIENT
Start: 2017-07-14 | End: 2017-07-14

## 2017-07-14 RX ORDER — OXYCODONE AND ACETAMINOPHEN 7.5; 325 MG/1; MG/1
2 TABLET ORAL EVERY 4 HOURS PRN
Status: DISCONTINUED | OUTPATIENT
Start: 2017-07-14 | End: 2017-07-14

## 2017-07-14 RX ORDER — DIPHENHYDRAMINE HYDROCHLORIDE 50 MG/ML
25 INJECTION INTRAMUSCULAR; INTRAVENOUS EVERY 4 HOURS PRN
Status: DISCONTINUED | OUTPATIENT
Start: 2017-07-14 | End: 2017-07-17 | Stop reason: HOSPADM

## 2017-07-14 RX ORDER — CLINDAMYCIN PHOSPHATE 900 MG/50ML
900 INJECTION INTRAVENOUS EVERY 8 HOURS
Status: DISCONTINUED | OUTPATIENT
Start: 2017-07-14 | End: 2017-07-15

## 2017-07-14 RX ORDER — KETOROLAC TROMETHAMINE 30 MG/ML
30 INJECTION, SOLUTION INTRAMUSCULAR; INTRAVENOUS EVERY 6 HOURS
Status: DISPENSED | OUTPATIENT
Start: 2017-07-14 | End: 2017-07-15

## 2017-07-14 RX ORDER — CEFAZOLIN SODIUM 1 G/3ML
INJECTION, POWDER, FOR SOLUTION INTRAMUSCULAR; INTRAVENOUS AS NEEDED
Status: DISCONTINUED | OUTPATIENT
Start: 2017-07-14 | End: 2017-07-14 | Stop reason: SURG

## 2017-07-14 RX ORDER — OXYCODONE HYDROCHLORIDE AND ACETAMINOPHEN 5; 325 MG/1; MG/1
1 TABLET ORAL EVERY 6 HOURS PRN
Status: DISCONTINUED | OUTPATIENT
Start: 2017-07-15 | End: 2017-07-17 | Stop reason: HOSPADM

## 2017-07-14 RX ORDER — OXYCODONE HYDROCHLORIDE AND ACETAMINOPHEN 5; 325 MG/1; MG/1
2 TABLET ORAL EVERY 6 HOURS PRN
Status: DISCONTINUED | OUTPATIENT
Start: 2017-07-15 | End: 2017-07-14

## 2017-07-14 RX ORDER — OXYCODONE AND ACETAMINOPHEN 7.5; 325 MG/1; MG/1
1 TABLET ORAL EVERY 4 HOURS PRN
Status: ACTIVE | OUTPATIENT
Start: 2017-07-14 | End: 2017-07-15

## 2017-07-14 RX ORDER — CARBOPROST TROMETHAMINE 250 UG/ML
250 INJECTION, SOLUTION INTRAMUSCULAR AS NEEDED
Status: DISCONTINUED | OUTPATIENT
Start: 2017-07-14 | End: 2017-07-14 | Stop reason: HOSPADM

## 2017-07-14 RX ORDER — METHYLERGONOVINE MALEATE 0.2 MG/ML
200 INJECTION INTRAVENOUS AS NEEDED
Status: DISCONTINUED | OUTPATIENT
Start: 2017-07-14 | End: 2017-07-14 | Stop reason: HOSPADM

## 2017-07-14 RX ORDER — ONDANSETRON 4 MG/1
4 TABLET, ORALLY DISINTEGRATING ORAL EVERY 6 HOURS PRN
Status: DISCONTINUED | OUTPATIENT
Start: 2017-07-14 | End: 2017-07-17 | Stop reason: HOSPADM

## 2017-07-14 RX ORDER — NALOXONE HCL 0.4 MG/ML
0.4 VIAL (ML) INJECTION
Status: ACTIVE | OUTPATIENT
Start: 2017-07-14 | End: 2017-07-15

## 2017-07-14 RX ORDER — SODIUM CHLORIDE, SODIUM LACTATE, POTASSIUM CHLORIDE, CALCIUM CHLORIDE 600; 310; 30; 20 MG/100ML; MG/100ML; MG/100ML; MG/100ML
125 INJECTION, SOLUTION INTRAVENOUS CONTINUOUS
Status: DISCONTINUED | OUTPATIENT
Start: 2017-07-14 | End: 2017-07-17 | Stop reason: HOSPADM

## 2017-07-14 RX ORDER — METHYLERGONOVINE MALEATE 0.2 MG/ML
200 INJECTION INTRAVENOUS
Status: ACTIVE | OUTPATIENT
Start: 2017-07-14 | End: 2017-07-15

## 2017-07-14 RX ORDER — MISOPROSTOL 200 UG/1
800 TABLET ORAL AS NEEDED
Status: DISCONTINUED | OUTPATIENT
Start: 2017-07-14 | End: 2017-07-14 | Stop reason: HOSPADM

## 2017-07-14 RX ORDER — KETOROLAC TROMETHAMINE 30 MG/ML
INJECTION, SOLUTION INTRAMUSCULAR; INTRAVENOUS AS NEEDED
Status: DISCONTINUED | OUTPATIENT
Start: 2017-07-14 | End: 2017-07-14 | Stop reason: SURG

## 2017-07-14 RX ORDER — ONDANSETRON 2 MG/ML
INJECTION INTRAMUSCULAR; INTRAVENOUS AS NEEDED
Status: DISCONTINUED | OUTPATIENT
Start: 2017-07-14 | End: 2017-07-14 | Stop reason: SURG

## 2017-07-14 RX ORDER — PROMETHAZINE HYDROCHLORIDE 25 MG/ML
12.5 INJECTION, SOLUTION INTRAMUSCULAR; INTRAVENOUS EVERY 6 HOURS PRN
Status: DISCONTINUED | OUTPATIENT
Start: 2017-07-14 | End: 2017-07-17 | Stop reason: HOSPADM

## 2017-07-14 RX ORDER — IBUPROFEN 800 MG/1
800 TABLET ORAL EVERY 8 HOURS PRN
Status: DISCONTINUED | OUTPATIENT
Start: 2017-07-15 | End: 2017-07-17 | Stop reason: HOSPADM

## 2017-07-14 RX ORDER — CARBOPROST TROMETHAMINE 250 UG/ML
250 INJECTION, SOLUTION INTRAMUSCULAR
Status: ACTIVE | OUTPATIENT
Start: 2017-07-14 | End: 2017-07-15

## 2017-07-14 RX ORDER — DOCUSATE SODIUM 100 MG/1
100 CAPSULE, LIQUID FILLED ORAL 2 TIMES DAILY PRN
Status: DISCONTINUED | OUTPATIENT
Start: 2017-07-14 | End: 2017-07-17 | Stop reason: HOSPADM

## 2017-07-14 RX ORDER — BUPIVACAINE HYDROCHLORIDE 7.5 MG/ML
INJECTION, SOLUTION EPIDURAL; RETROBULBAR AS NEEDED
Status: DISCONTINUED | OUTPATIENT
Start: 2017-07-14 | End: 2017-07-14 | Stop reason: SURG

## 2017-07-14 RX ORDER — OXYTOCIN/RINGER'S LACTATE 20/1000 ML
PLASTIC BAG, INJECTION (ML) INTRAVENOUS
Status: COMPLETED
Start: 2017-07-14 | End: 2017-07-14

## 2017-07-14 RX ORDER — MISOPROSTOL 200 UG/1
800 TABLET ORAL ONCE AS NEEDED
Status: ACTIVE | OUTPATIENT
Start: 2017-07-14 | End: 2017-07-15

## 2017-07-14 RX ORDER — SODIUM CHLORIDE, SODIUM LACTATE, POTASSIUM CHLORIDE, CALCIUM CHLORIDE 600; 310; 30; 20 MG/100ML; MG/100ML; MG/100ML; MG/100ML
INJECTION, SOLUTION INTRAVENOUS CONTINUOUS PRN
Status: DISCONTINUED | OUTPATIENT
Start: 2017-07-14 | End: 2017-07-14 | Stop reason: SURG

## 2017-07-14 RX ORDER — OXYCODONE HYDROCHLORIDE AND ACETAMINOPHEN 5; 325 MG/1; MG/1
1 TABLET ORAL EVERY 6 HOURS PRN
Status: DISCONTINUED | OUTPATIENT
Start: 2017-07-15 | End: 2017-07-14

## 2017-07-14 RX ORDER — OXYTOCIN/RINGER'S LACTATE 20/1000 ML
125 PLASTIC BAG, INJECTION (ML) INTRAVENOUS CONTINUOUS PRN
Status: COMPLETED | OUTPATIENT
Start: 2017-07-14 | End: 2017-07-14

## 2017-07-14 RX ORDER — PROMETHAZINE HYDROCHLORIDE 12.5 MG/1
12.5 SUPPOSITORY RECTAL EVERY 6 HOURS PRN
Status: DISCONTINUED | OUTPATIENT
Start: 2017-07-14 | End: 2017-07-14 | Stop reason: HOSPADM

## 2017-07-14 RX ORDER — TRISODIUM CITRATE DIHYDRATE AND CITRIC ACID MONOHYDRATE 500; 334 MG/5ML; MG/5ML
15 SOLUTION ORAL ONCE
Status: COMPLETED | OUTPATIENT
Start: 2017-07-14 | End: 2017-07-14

## 2017-07-14 RX ORDER — BISACODYL 10 MG
10 SUPPOSITORY, RECTAL RECTAL DAILY PRN
Status: DISCONTINUED | OUTPATIENT
Start: 2017-07-14 | End: 2017-07-17 | Stop reason: HOSPADM

## 2017-07-14 RX ORDER — PROMETHAZINE HYDROCHLORIDE 25 MG/1
25 TABLET ORAL EVERY 6 HOURS PRN
Status: DISCONTINUED | OUTPATIENT
Start: 2017-07-14 | End: 2017-07-17 | Stop reason: HOSPADM

## 2017-07-14 RX ORDER — MIDAZOLAM HYDROCHLORIDE 1 MG/ML
INJECTION INTRAMUSCULAR; INTRAVENOUS AS NEEDED
Status: DISCONTINUED | OUTPATIENT
Start: 2017-07-14 | End: 2017-07-14 | Stop reason: SURG

## 2017-07-14 RX ORDER — DEXAMETHASONE SODIUM PHOSPHATE 4 MG/ML
INJECTION, SOLUTION INTRA-ARTICULAR; INTRALESIONAL; INTRAMUSCULAR; INTRAVENOUS; SOFT TISSUE AS NEEDED
Status: DISCONTINUED | OUTPATIENT
Start: 2017-07-14 | End: 2017-07-14 | Stop reason: SURG

## 2017-07-14 RX ORDER — NALOXONE HCL 0.4 MG/ML
0.4 VIAL (ML) INJECTION
Status: DISCONTINUED | OUTPATIENT
Start: 2017-07-14 | End: 2017-07-14

## 2017-07-14 RX ORDER — SODIUM CHLORIDE 0.9 % (FLUSH) 0.9 %
1-10 SYRINGE (ML) INJECTION AS NEEDED
Status: DISCONTINUED | OUTPATIENT
Start: 2017-07-14 | End: 2017-07-14

## 2017-07-14 RX ORDER — HYDROCODONE BITARTRATE AND ACETAMINOPHEN 5; 325 MG/1; MG/1
1 TABLET ORAL EVERY 4 HOURS PRN
Status: DISCONTINUED | OUTPATIENT
Start: 2017-07-14 | End: 2017-07-14 | Stop reason: HOSPADM

## 2017-07-14 RX ORDER — BUTORPHANOL TARTRATE 1 MG/ML
0.5 INJECTION, SOLUTION INTRAMUSCULAR; INTRAVENOUS EVERY 6 HOURS PRN
Status: DISCONTINUED | OUTPATIENT
Start: 2017-07-14 | End: 2017-07-17 | Stop reason: HOSPADM

## 2017-07-14 RX ORDER — BISACODYL 5 MG/1
10 TABLET, DELAYED RELEASE ORAL DAILY PRN
Status: DISCONTINUED | OUTPATIENT
Start: 2017-07-14 | End: 2017-07-17 | Stop reason: HOSPADM

## 2017-07-14 RX ADMIN — Medication 125 ML/HR: at 11:06

## 2017-07-14 RX ADMIN — CEFAZOLIN 3 G: 1 INJECTION, POWDER, FOR SOLUTION INTRAVENOUS at 09:09

## 2017-07-14 RX ADMIN — OXYTOCIN 1 UNITS/MIN: 10 INJECTION INTRAVENOUS at 09:04

## 2017-07-14 RX ADMIN — KETOROLAC TROMETHAMINE 30 MG: 30 INJECTION, SOLUTION INTRAMUSCULAR at 09:37

## 2017-07-14 RX ADMIN — MIDAZOLAM HYDROCHLORIDE 2 MG: 1 INJECTION, SOLUTION INTRAMUSCULAR; INTRAVENOUS at 09:04

## 2017-07-14 RX ADMIN — BUPIVACAINE HYDROCHLORIDE 1.4 ML: 7.5 INJECTION, SOLUTION EPIDURAL; RETROBULBAR at 08:48

## 2017-07-14 RX ADMIN — CEFEPIME 2 G: 2 INJECTION, POWDER, FOR SOLUTION INTRAVENOUS at 23:13

## 2017-07-14 RX ADMIN — SODIUM CITRATE AND CITRIC ACID MONOHYDRATE 15 ML: 500; 334 SOLUTION ORAL at 07:20

## 2017-07-14 RX ADMIN — HYDROMORPHONE HYDROCHLORIDE 900 MCG: 1 INJECTION, SOLUTION INTRAMUSCULAR; INTRAVENOUS; SUBCUTANEOUS at 09:08

## 2017-07-14 RX ADMIN — SODIUM CHLORIDE, POTASSIUM CHLORIDE, SODIUM LACTATE AND CALCIUM CHLORIDE 1000 ML: 600; 310; 30; 20 INJECTION, SOLUTION INTRAVENOUS at 06:14

## 2017-07-14 RX ADMIN — SODIUM CHLORIDE, POTASSIUM CHLORIDE, SODIUM LACTATE AND CALCIUM CHLORIDE 125 ML/HR: 600; 310; 30; 20 INJECTION, SOLUTION INTRAVENOUS at 18:07

## 2017-07-14 RX ADMIN — NALBUPHINE HYDROCHLORIDE 2.5 MG: 10 INJECTION, SOLUTION INTRAMUSCULAR; INTRAVENOUS; SUBCUTANEOUS at 18:07

## 2017-07-14 RX ADMIN — NALBUPHINE HYDROCHLORIDE 2.5 MG: 10 INJECTION, SOLUTION INTRAMUSCULAR; INTRAVENOUS; SUBCUTANEOUS at 12:41

## 2017-07-14 RX ADMIN — ONDANSETRON HYDROCHLORIDE 4 MG: 2 SOLUTION INTRAMUSCULAR; INTRAVENOUS at 09:07

## 2017-07-14 RX ADMIN — CLINDAMYCIN PHOSPHATE 900 MG: 18 INJECTION, SOLUTION INTRAVENOUS at 11:17

## 2017-07-14 RX ADMIN — KETOROLAC TROMETHAMINE 30 MG: 30 INJECTION, SOLUTION INTRAMUSCULAR at 23:13

## 2017-07-14 RX ADMIN — KETOROLAC TROMETHAMINE 30 MG: 30 INJECTION, SOLUTION INTRAMUSCULAR at 18:07

## 2017-07-14 RX ADMIN — HYDROMORPHONE HYDROCHLORIDE 100 MCG: 1 INJECTION, SOLUTION INTRAMUSCULAR; INTRAVENOUS; SUBCUTANEOUS at 08:48

## 2017-07-14 RX ADMIN — SODIUM CHLORIDE, POTASSIUM CHLORIDE, SODIUM LACTATE AND CALCIUM CHLORIDE 125 ML/HR: 600; 310; 30; 20 INJECTION, SOLUTION INTRAVENOUS at 07:01

## 2017-07-14 RX ADMIN — KETOROLAC TROMETHAMINE 30 MG: 30 INJECTION, SOLUTION INTRAMUSCULAR at 09:38

## 2017-07-14 RX ADMIN — CLINDAMYCIN PHOSPHATE 900 MG: 18 INJECTION, SOLUTION INTRAVENOUS at 19:14

## 2017-07-14 RX ADMIN — CETIRIZINE HYDROCHLORIDE 10 MG: 10 TABLET, FILM COATED ORAL at 10:57

## 2017-07-14 RX ADMIN — BUTORPHANOL TARTRATE 0.5 MG: 1 INJECTION, SOLUTION INTRAMUSCULAR; INTRAVENOUS at 14:08

## 2017-07-14 RX ADMIN — LAMOTRIGINE 100 MG: 100 TABLET ORAL at 21:18

## 2017-07-14 RX ADMIN — CEFEPIME 2 G: 2 INJECTION, POWDER, FOR SOLUTION INTRAVENOUS at 13:10

## 2017-07-14 RX ADMIN — CEFAZOLIN SODIUM 2 G: 2 SOLUTION INTRAVENOUS at 07:20

## 2017-07-14 RX ADMIN — SODIUM CHLORIDE, POTASSIUM CHLORIDE, SODIUM LACTATE AND CALCIUM CHLORIDE: 600; 310; 30; 20 INJECTION, SOLUTION INTRAVENOUS at 08:49

## 2017-07-14 RX ADMIN — MIDAZOLAM HYDROCHLORIDE 3 MG: 1 INJECTION, SOLUTION INTRAMUSCULAR; INTRAVENOUS at 09:10

## 2017-07-14 RX ADMIN — PRENATAL VIT W/ FE FUMARATE-FA TAB 27-0.8 MG 1 TABLET: 27-0.8 TAB at 10:57

## 2017-07-14 RX ADMIN — NALBUPHINE HYDROCHLORIDE 2.5 MG: 10 INJECTION, SOLUTION INTRAMUSCULAR; INTRAVENOUS; SUBCUTANEOUS at 23:51

## 2017-07-14 RX ADMIN — DEXAMETHASONE SODIUM PHOSPHATE 8 MG: 4 INJECTION, SOLUTION INTRAMUSCULAR; INTRAVENOUS at 09:07

## 2017-07-14 RX ADMIN — CEFAZOLIN 2 G: 1 INJECTION, POWDER, FOR SOLUTION INTRAVENOUS at 08:53

## 2017-07-14 RX ADMIN — SODIUM CHLORIDE, POTASSIUM CHLORIDE, SODIUM LACTATE AND CALCIUM CHLORIDE: 600; 310; 30; 20 INJECTION, SOLUTION INTRAVENOUS at 08:50

## 2017-07-14 NOTE — ANESTHESIA PREPROCEDURE EVALUATION
Anesthesia Evaluation     Patient summary reviewed and Nursing notes reviewed   no history of anesthetic complications:  NPO Solid Status: > 8 hours  NPO Liquid Status: > 8 hours     Airway   Mallampati: II  TM distance: >3 FB  Neck ROM: full  no difficulty expected  Dental - normal exam     Pulmonary    (+) asthma,   Cardiovascular - negative cardio ROS  Exercise tolerance: good (4-7 METS)        Neuro/Psych  (+) psychiatric history Anxiety and Depression,    GI/Hepatic/Renal/Endo    (+)  GERD well controlled,     Musculoskeletal (-) negative ROS    Abdominal    Substance History - negative use     OB/GYN    (+) Pregnant,         Other                               Lab Results   Component Value Date    WBC 8.56 07/13/2017    HGB 11.1 (L) 07/13/2017    HCT 34.3 (L) 07/13/2017    MCV 90.3 07/13/2017     07/13/2017              Anesthesia Plan    ASA 2     spinal     Anesthetic plan and risks discussed with patient.  Use of blood products discussed with patient  Consented to blood products.

## 2017-07-14 NOTE — PAYOR COMM NOTE
"[facility]    [place],    [phone]  Or   Fax   [phone]     Amanda Proctor (32 y.o. Female)     Date of Birth Social Security Number Address Home Phone MRN    1985  [facility] [address] [address] [phone] [id number]    Zoroastrianism Marital Status          None        Admission Date Admission Type Admitting Provider Attending Provider Department, Room/Bed    17 Elective Kaleigh Brown MD Mueller, Kaleigh Adkins MD [facility] LABOR DELIVERY, LPA    Discharge Date Discharge Disposition Discharge Destination                      Attending Provider: Kaleigh Brown MD     Allergies:  Cabbage, Shellfish-derived Products, Flagyl [Metronidazole], Risperidone And Related    Isolation:  None   Infection:  None   Code Status:  FULL    Ht:  66\" (167.6 cm)   Wt:  167 lb (75.8 kg)    Admission Cmt:  None   Principal Problem:   delivery delivered [O82]                 Active Insurance as of 2017     Primary Coverage     Payor Plan Insurance Group Employer/Plan Group    ANTHEM BLUE CROSS ANTHEM PATHWAYS PAR [id number]     Payor Plan Address Payor Plan Phone Number Effective From Effective To    [address] [phone] 2015     [place] [address]       Subscriber Name Subscriber Birth Date Member ID       AMANDA PROCTOR 1985 [id number]           Secondary Coverage     Payor Plan Insurance Group Employer/Plan Group    [place] [id number]     Payor Plan Address Payor Plan Phone Number Effective From Effective To    PO  [phone] 2014     [place] [address]       Subscriber Name Subscriber Birth Date Member ID       AMANDA PROCTOR 1985 [id number]                 Emergency Contacts      (Rel.) Home Phone Work Phone Mobile Phone    Gloria Burdick (Power of ) [phone] -- --    ProctorRupal arceo (Mother) -- -- [phone]      17 REPEAT  AT " 0903   MALE  7LBS 11.1 OZ  APGAR 9/9    EDC 17  G-1   P-1   39 WEEKS GESTATION.      Vital Signs (last 24 hours)        0700  -   0659 700  -   1134   Most Recent    Temp (°F)   98.8      97.1     97.1 (36.2)    Heart Rate 85 -  111    64 -  83     67    Resp 16 -  20      16     16    /79 -  122/74    97/52 -  127/78     127/78    SpO2 (%)   100    94 -  99     98

## 2017-07-14 NOTE — OP NOTE
Section Procedure Note    Indications: Previous  Section    Pre-operative Diagnosis: 39 week 4 day pregnancy.    Post-operative Diagnosis: same    Planned Procedure:  Repeat  Section    Surgeon: Kaleigh Brown MD     Assistants: none    Anesthesia: Spinal anesthesia    ASA Class: 2      Procedure Details     The risks, benefits, indications and alternatives of the procedure were reviewed with the patient and informed consent was obtained. The patient was taken to the Operating Room with an IV running. She was placed in the dorsal supine position, with a leftward tilt, then prepped and draped in the usual sterile fashion. Spinal anesthesia was then administrated without difficulty. A Pfannenstiel skin incision was made approximately 2cm above the symphysis pubis and extended down sharply to the rectus fascia. The fascia was then nicked in the midline and extended laterally. The muscles of the anterior abdominal wall were . The fascia was then grasped and the fascial incision extended laterally via superior and inferior traction. The peritoneum was next identified, and entered bluntly with the digits. The peritoneal incision was also extended via traction, taking care to note the position of the bladder. Next a bladder blade was inserted. A vesicouterine incision was made with the metzembaum scissors, and a bladder flap gently created. The bladder blade was then reinserted. Next, the uterine incision was made with the scapel, in transverse, elliptical fashion. The uterine incision was extended laterally via traction. The bladder blade was then removed, and the fetal vertex delivered through the uterine incision without difficulty. Nares and mouth were suctioned on the sterile field. The remainder of the infant was then delivered, and infant passed to the awaiting NICU team. Next, the placenta was delivered manually, taking care to remove all membranes. The uterus was then  exteriorized, and cleared of all clot and debris. The uterine incision was closed with 0-Vicryl in running, locking fashion. Excellent hemostasis was noted. Copious irrigation of the posterior cul-de-sac was performed with warm, sterile saline. The uterus was returned to the abdominal cavity, and the gutters cleared of all clot and debris. Interceed was placed over the uterine incision and over the body of the uterus to prevent future adhesions. The fascia was then closed with 0-Vicryl in running fashion. Next interceed was layered over the fascia to prevent future adhesions. The skin was subsequently closed with stratifix suture in 2 layers. The skin was then dressed with a mepilex gauze.    Findings:  VMI  In cephalic, OA position, clear,no nuchal cord. Manual extraction of  Normal placenta and cord. Apgars         APGARS  One minute Five minutes Ten minutes Fifteen minutes Twenty minutes   Skin color: 1   1             Heart rate: 2   2             Grimace: 2   2              Muscle tone: 2   2              Breathin   2              Totals: 9   9              , Weight: 7 lb 11.1 oz (3490 g)  Length: 20  in. NICU/Nursery Present.    Estimated Blood Loss:  600ml           Drains: 1200ml           Total IV Fluids: 1600ml           Specimens: Placenta & cord blood           Implants: Interceed x 3           Complications:  None; patient tolerated the procedure well.           Disposition: PACU - hemodynamically stable.           Condition: stable    Attending Attestation: I performed the procedure.    Kaleigh Brown MD  2017  11:46 AM

## 2017-07-14 NOTE — LACTATION NOTE
This note was copied from a baby's chart.  Lactation visit completed. Called to room by RN to set mom up with a breast pump. Mom has a history of a low milk supply with her other 3 children and also has a history of breast augmentation. She expresses desire to pump at this time. Educated mom we do not want to pump in the place of a feeding quite yet and offered to latch baby on to the breast and then pump after feedings for 10 minutes on bilateral breasts. Mom agrees and successful feeding on bilateral breasts is done while I am at the bedside. Baby latches well directly onto the breast with no latch assist device and a strong suck and audible swallows are noted. Mom can hand express easily bilaterally. Educated mom on how to use our hospital grade pumps and how to clean the pump appropriately. After feeding mom pumped 0.3 ml of thick, yellow colostrum. Encouragement and support provided for mom. The following information was reviewed with mom : Breastfeeding A Great Start Book by Marion Grimm RN, LCCE, ICD and JORGE Crockett MD, FACOG    [facility] by [facility]    Freshly Expressed Breastmilk Storage Guidelines for Healthy Term Babies References: www.BreastmilkGuidelines.com     Strongly encouraged mom attempt to feed every 2-3 hours on bilateral breasts or even on demand. Education on hunger cues given. Belt phone number put on white board and encouraged mom to call lactation with any questions or concerns or if she can not obtain a successful latch. Mom is approved for pump from [employer] per Claudia at [facility]. No further questions at this time. Support provided for mom.

## 2017-07-14 NOTE — LACTATION NOTE
This note was copied from a baby's chart.  , 39  4/7 week gestation, infant boy, Pio Correa, born today 17 @ 0903 via C section, birth weight 7 lbs 11.1 oz ( 3490 grams). Infant latched in c section recovery without difficulty, breast fed well. Assessment completed, see assessment. Education completed, see education. Provided mom with support and encouragement.     Breast fed other 3 children for a few weeks, difficulty with latch, decrease in supply when started supplementing with formula, Other children all had problems with formula, Mom desires to exclusively give infant breast milk, requested breast pump as soon as settle on mom/baby unit. Instructed mom to pump for 10 minutes immediately following breastfeeding due to low milk supply.     Maternal Hx: Hypoglycemia, Anxiety, GERD, Breast Surgery, Smoker, Depression  Prenatal Medications:     Mom Requested to use breast pump following breastfeeding to assist with supply due to history of low supply    Breast pump: Does not have breast pump for home use, requested RX be sent to [facility]    1101 Faxed Breast pump Rx to [facility] per Moms Request

## 2017-07-14 NOTE — ANESTHESIA PROCEDURE NOTES
Spinal Block    Patient location during procedure: OB  Performed By  CRNA: CHEMA TOWNSEND  Preanesthetic Checklist  Completed: patient identified, site marked, surgical consent, pre-op evaluation, timeout performed, IV checked, risks and benefits discussed and monitors and equipment checked  Spinal Block Prep:  Patient Position:sitting  Sterile Tech:mask, sterile barriers and gloves  Prep:Chloraprep  Patient Monitoring:blood pressure monitoring, continuous pulse oximetry and EKG  Spinal Block Procedure  Approach:midline  Guidance:landmark technique and palpation technique  Location:L4-L5  Needle Type:Sprotte  Needle Gauge:24 G  Placement of Spinal needle event:cerebrospinal fluid aspirated and No paresthesia, CSF aspirated freely prior to injection and at end of injection  Paresthesia: no  Fluid Appearance:clear  Post Assessment  Patient Tolerance:patient tolerated the procedure well with no apparent complications  Complications no

## 2017-07-15 LAB
BASOPHILS # BLD AUTO: 0.01 10*3/MM3 (ref 0–0.2)
BASOPHILS NFR BLD AUTO: 0.1 % (ref 0–2)
DEPRECATED RDW RBC AUTO: 46.3 FL (ref 40–54)
EOSINOPHIL # BLD AUTO: 0.06 10*3/MM3 (ref 0–0.7)
EOSINOPHIL NFR BLD AUTO: 0.6 % (ref 0–4)
ERYTHROCYTE [DISTWIDTH] IN BLOOD BY AUTOMATED COUNT: 14.1 % (ref 12–15)
HCT VFR BLD AUTO: 29.1 % (ref 37–47)
HGB BLD-MCNC: 9.6 G/DL (ref 12–16)
IMM GRANULOCYTES # BLD: 0.03 10*3/MM3 (ref 0–0.03)
IMM GRANULOCYTES NFR BLD: 0.3 % (ref 0–5)
LYMPHOCYTES # BLD AUTO: 2.04 10*3/MM3 (ref 0.72–4.86)
LYMPHOCYTES NFR BLD AUTO: 20.8 % (ref 15–45)
MCH RBC QN AUTO: 29.7 PG (ref 28–32)
MCHC RBC AUTO-ENTMCNC: 33 G/DL (ref 33–36)
MCV RBC AUTO: 90.1 FL (ref 82–98)
MONOCYTES # BLD AUTO: 0.6 10*3/MM3 (ref 0.19–1.3)
MONOCYTES NFR BLD AUTO: 6.1 % (ref 4–12)
NEUTROPHILS # BLD AUTO: 7.09 10*3/MM3 (ref 1.87–8.4)
NEUTROPHILS NFR BLD AUTO: 72.1 % (ref 39–78)
PLATELET # BLD AUTO: 228 10*3/MM3 (ref 130–400)
PMV BLD AUTO: 11 FL (ref 6–12)
RBC # BLD AUTO: 3.23 10*6/MM3 (ref 4.2–5.4)
WBC NRBC COR # BLD: 9.83 10*3/MM3 (ref 4.8–10.8)

## 2017-07-15 PROCEDURE — 25010000002 KETOROLAC TROMETHAMINE PER 15 MG: Performed by: OBSTETRICS & GYNECOLOGY

## 2017-07-15 PROCEDURE — 85025 COMPLETE CBC W/AUTO DIFF WBC: CPT | Performed by: OBSTETRICS & GYNECOLOGY

## 2017-07-15 PROCEDURE — 25010000002 NALBUPHINE PER 10 MG: Performed by: NURSE ANESTHETIST, CERTIFIED REGISTERED

## 2017-07-15 RX ADMIN — LAMOTRIGINE 100 MG: 100 TABLET ORAL at 08:31

## 2017-07-15 RX ADMIN — OXYCODONE HYDROCHLORIDE AND ACETAMINOPHEN 2 TABLET: 5; 325 TABLET ORAL at 23:16

## 2017-07-15 RX ADMIN — SODIUM CHLORIDE, POTASSIUM CHLORIDE, SODIUM LACTATE AND CALCIUM CHLORIDE 125 ML/HR: 600; 310; 30; 20 INJECTION, SOLUTION INTRAVENOUS at 05:09

## 2017-07-15 RX ADMIN — KETOROLAC TROMETHAMINE 30 MG: 30 INJECTION, SOLUTION INTRAMUSCULAR at 05:09

## 2017-07-15 RX ADMIN — NALBUPHINE HYDROCHLORIDE 2.5 MG: 10 INJECTION, SOLUTION INTRAMUSCULAR; INTRAVENOUS; SUBCUTANEOUS at 05:08

## 2017-07-15 RX ADMIN — OXYCODONE HYDROCHLORIDE AND ACETAMINOPHEN 1 TABLET: 5; 325 TABLET ORAL at 17:28

## 2017-07-15 RX ADMIN — IBUPROFEN 800 MG: 800 TABLET, FILM COATED ORAL at 13:48

## 2017-07-15 RX ADMIN — LAMOTRIGINE 100 MG: 100 TABLET ORAL at 22:19

## 2017-07-15 RX ADMIN — IBUPROFEN 800 MG: 800 TABLET, FILM COATED ORAL at 21:25

## 2017-07-15 RX ADMIN — CLINDAMYCIN PHOSPHATE 900 MG: 18 INJECTION, SOLUTION INTRAVENOUS at 03:19

## 2017-07-15 NOTE — PLAN OF CARE
Problem: Patient Care Overview (Adult)  Goal: Plan of Care Review  Outcome: Ongoing (interventions implemented as appropriate)    07/15/17 0412   Coping/Psychosocial Response Interventions   Plan Of Care Reviewed With patient   Patient Care Overview   Progress improving   Outcome Evaluation   Outcome Summary/Follow up Plan VSS during shift. voiding per catheter. ambulated in roe last night. pain well controlled with scheduled IV med. patient complain of itching. no c/o n/v, dizziness, or headache. appeared to rest well during night. fundus U1 firm midline with scant bleeeding        Goal: Adult Individualization and Mutuality  Outcome: Ongoing (interventions implemented as appropriate)    07/15/17 0412   Individualization   Patient Specific Preferences patient wants scheduled toradol and q4 itching med, lots of ice   Patient Specific Goals ambulation this AM and cath out at 0600   Mutuality/Individual Preferences   What Anxieties, Fears or Concerns Do You Have About Your Health or Care? itching   What Questions Do You Have About Your Health or Care? none at this time        Goal: Discharge Needs Assessment  Outcome: Ongoing (interventions implemented as appropriate)    Problem: Anesthesia/Analgesia, Neuraxial (Obstetrics)  Goal: Signs and Symptoms of Listed Potential Problems Will be Absent or Manageable (Anesthesia/Analgesia, Neuraxial)  Outcome: Ongoing (interventions implemented as appropriate)    Problem: Postpartum ( Delivery) (Adult)  Goal: Signs and Symptoms of Listed Potential Problems Will be Absent or Manageable (Postpartum)  Outcome: Ongoing (interventions implemented as appropriate)

## 2017-07-15 NOTE — PLAN OF CARE
Problem: Patient Care Overview (Adult)  Goal: Plan of Care Review  Outcome: Ongoing (interventions implemented as appropriate)    07/15/17 1716   Coping/Psychosocial Response Interventions   Plan Of Care Reviewed With patient   Patient Care Overview   Progress improving   Outcome Evaluation   Outcome Summary/Follow up Plan VVS, voiding, ambulating in roe, showered, po pain medication PRN, FF/ML/U1, scant lochia        Goal: Adult Individualization and Mutuality  Outcome: Ongoing (interventions implemented as appropriate)    07/15/17 1716   Individualization   Patient Specific Goals Home monday        Goal: Discharge Needs Assessment    07/15/17 1716   Discharge Needs Assessment   Concerns To Be Addressed no discharge needs identified         Problem: Postpartum ( Delivery) (Adult)  Goal: Signs and Symptoms of Listed Potential Problems Will be Absent or Manageable (Postpartum)  Outcome: Ongoing (interventions implemented as appropriate)    07/15/17 1716   Postpartum ( Delivery)   Problems Assessed (Postpartum ) all   Problems Present (Postpartum ) pain   Pain tolerated with PO medication

## 2017-07-15 NOTE — ANESTHESIA POSTPROCEDURE EVALUATION
"Patient: Amanda Kennedy    Procedure Summary     Date Anesthesia Start Anesthesia Stop Room / Location    17 0843 0948  [facility] LABOR DELIVERY  /  [facility] LABOR DELIVERY       Procedure Diagnosis Surgeon Provider     SECTION REPEAT WITHOUT TUBAL LIGATION (Bilateral Abdomen) (PREVIOUS  DELIVERY) MD Herminia Rich CRNA          Anesthesia Type: spinal  Last vitals  BP   Blood pressure 120/66, pulse 100, temperature 98.2 °F (36.8 °C), temperature source Oral, resp. rate 16, height 66\" (167.6 cm), weight 167 lb (75.8 kg), last menstrual period 10/13/2016, SpO2 100 %, currently breastfeeding.     Temp      Pulse     Resp      SpO2        Post Anesthesia Care and Evaluation    Anesthetic complications: No anesthetic complications  Post Neuraxial Block status: Motor and sensory function returned to baseline and No signs or symptoms of PDPH    "

## 2017-07-16 RX ADMIN — IBUPROFEN 800 MG: 800 TABLET, FILM COATED ORAL at 16:12

## 2017-07-16 RX ADMIN — LAMOTRIGINE 100 MG: 100 TABLET ORAL at 21:04

## 2017-07-16 RX ADMIN — LAMOTRIGINE 100 MG: 100 TABLET ORAL at 09:59

## 2017-07-16 RX ADMIN — OXYCODONE HYDROCHLORIDE AND ACETAMINOPHEN 2 TABLET: 5; 325 TABLET ORAL at 19:10

## 2017-07-16 RX ADMIN — IBUPROFEN 800 MG: 800 TABLET, FILM COATED ORAL at 06:23

## 2017-07-16 RX ADMIN — OXYCODONE HYDROCHLORIDE AND ACETAMINOPHEN 2 TABLET: 5; 325 TABLET ORAL at 10:28

## 2017-07-16 NOTE — PLAN OF CARE
Problem: Patient Care Overview (Adult)  Goal: Plan of Care Review  Outcome: Ongoing (interventions implemented as appropriate)    17   Coping/Psychosocial Response Interventions   Plan Of Care Reviewed With patient   Patient Care Overview   Progress improving   Outcome Evaluation   Outcome Summary/Follow up Plan VSS, ambulating in halls this shift, FF/ML/UU, ALT incision C/D/I with steri strips intact        Goal: Adult Individualization and Mutuality  Outcome: Ongoing (interventions implemented as appropriate)    17   Individualization   Patient Specific Goals plans home in morning        Goal: Discharge Needs Assessment  Outcome: Ongoing (interventions implemented as appropriate)    17   Discharge Needs Assessment   Concerns To Be Addressed no discharge needs identified         Problem: Postpartum ( Delivery) (Adult)  Goal: Signs and Symptoms of Listed Potential Problems Will be Absent or Manageable (Postpartum)  Outcome: Ongoing (interventions implemented as appropriate)    17   Postpartum ( Delivery)   Problems Assessed (Postpartum ) all   Problems Present (Postpartum ) none   Pain tolerated with PO medication

## 2017-07-16 NOTE — PLAN OF CARE
Problem: Patient Care Overview (Adult)  Goal: Plan of Care Review  Outcome: Ongoing (interventions implemented as appropriate)    17 0419   Coping/Psychosocial Response Interventions   Plan Of Care Reviewed With patient   Patient Care Overview   Progress improving   Outcome Evaluation   Outcome Summary/Follow up Plan VSS during shift. voiding and ambulating in roe well. pain controlled with PO pain meds. fundus firm midline U1 with scant bleeding. pumping and attempting to put baby to breast. patient has swelling above and below incision. no redness/warmth/drainage.        Goal: Adult Individualization and Mutuality  Outcome: Ongoing (interventions implemented as appropriate)    179   Individualization   Patient Specific Preferences patient wants motrin for pain, heat applied to back   Patient Specific Goals home monday    Patient Specific Interventions motrin as ordered and needed   Mutuality/Individual Preferences   What Anxieties, Fears or Concerns Do You Have About Your Health or Care? none at this time        Goal: Discharge Needs Assessment  Outcome: Ongoing (interventions implemented as appropriate)    Problem: Anesthesia/Analgesia, Neuraxial (Obstetrics)  Goal: Signs and Symptoms of Listed Potential Problems Will be Absent or Manageable (Anesthesia/Analgesia, Neuraxial)  Outcome: Ongoing (interventions implemented as appropriate)    Problem: Postpartum ( Delivery) (Adult)  Goal: Signs and Symptoms of Listed Potential Problems Will be Absent or Manageable (Postpartum)  Outcome: Ongoing (interventions implemented as appropriate)

## 2017-07-17 VITALS
OXYGEN SATURATION: 97 % | HEIGHT: 66 IN | BODY MASS INDEX: 26.84 KG/M2 | HEART RATE: 86 BPM | SYSTOLIC BLOOD PRESSURE: 123 MMHG | TEMPERATURE: 98.3 F | RESPIRATION RATE: 18 BRPM | WEIGHT: 167 LBS | DIASTOLIC BLOOD PRESSURE: 72 MMHG

## 2017-07-17 PROBLEM — O34.211 MATERNAL CARE DUE TO LOW TRANSVERSE UTERINE SCAR FROM PREVIOUS CESAREAN DELIVERY: Status: RESOLVED | Noted: 2017-07-14 | Resolved: 2017-07-17

## 2017-07-17 PROBLEM — Z34.90 PREGNANCY: Status: RESOLVED | Noted: 2017-06-23 | Resolved: 2017-07-17

## 2017-07-17 LAB
CYTO UR: NORMAL
LAB AP CASE REPORT: NORMAL
LAB AP CLINICAL INFORMATION: NORMAL
Lab: NORMAL
PATH REPORT.FINAL DX SPEC: NORMAL
PATH REPORT.GROSS SPEC: NORMAL

## 2017-07-17 RX ORDER — OXYCODONE HYDROCHLORIDE AND ACETAMINOPHEN 5; 325 MG/1; MG/1
1 TABLET ORAL EVERY 6 HOURS PRN
Qty: 50 TABLET | Refills: 0 | Status: SHIPPED | OUTPATIENT
Start: 2017-07-17 | End: 2017-07-25

## 2017-07-17 RX ORDER — IBUPROFEN 800 MG/1
800 TABLET ORAL EVERY 8 HOURS PRN
Qty: 90 TABLET | Refills: 2 | Status: SHIPPED | OUTPATIENT
Start: 2017-07-17

## 2017-07-17 RX ADMIN — OXYCODONE HYDROCHLORIDE AND ACETAMINOPHEN 2 TABLET: 5; 325 TABLET ORAL at 08:43

## 2017-07-17 RX ADMIN — LAMOTRIGINE 100 MG: 100 TABLET ORAL at 08:43

## 2017-07-17 RX ADMIN — IBUPROFEN 800 MG: 800 TABLET, FILM COATED ORAL at 04:45

## 2017-07-17 NOTE — PLAN OF CARE
Anesthesia/Analgesia, Neuraxial (Obstetrics)    • Signs and Symptoms of Listed Potential Problems Will be Absent or Manageable (Anesthesia/Analgesia, Neuraxial) Outcome(s) achieved        Patient Care Overview (Adult)    • Plan of Care Review Outcome(s) achieved    • Adult Individualization and Mutuality Outcome(s) achieved    • Discharge Needs Assessment Outcome(s) achieved        Postpartum ( Delivery) (Adult)    • Signs and Symptoms of Listed Potential Problems Will be Absent or Manageable (Postpartum) Outcome(s) achieved        Pt ready for d/c home

## 2017-07-17 NOTE — LACTATION NOTE
This note was copied from a baby's chart.  , 39  4/7 week gestation, infant boy, Pio Correa, born today 17 @ 0903 via C section. Day 3 weight loss - 4 %. 1 wet 2 stool. Bottle feeding EBM. No breastfeeds charted since 7/15/17. Mother states she is breastfeeding and pumping to bottle feeding EBM. She plans to do both until infant is satisfied with just breastfeeding. Discussed milk production and pumping in addition to breastfeeding. Instructed mother to breastfeed first then pump after and offer infant bottle feed of EBM if infant continues to show hunger cues. Reviewed discharge breastfeeding teaching. Hand pump and lanolin given.     Maternal Hx: , Hypoglycemia, Anxiety, GERD, Breast Surgery, Smoker, Depression, Low milk supply  Prenatal Medications: Klonopin (L3), Lamictal (L2), PNV, Atrovent, Claritin, Valtrex, Benadryl, Prilosec  Pump: Double Electric Medela Breast Pump

## 2017-07-17 NOTE — DISCHARGE SUMMARY
Discharge Summary    [facility]  Amanda Kennedy  : 1985  MRN: [id number]  CSN: [id number]    Date of Admission: 2017   Date of Discharge:  2017   Delivering Physician:          Admission Diagnosis: 1. Maternal care due to low transverse uterine scar from previous  delivery [O34.211]  2. Status post repeat low transverse  section [Z98.891]  3. Maternal care due to low transverse uterine scar from previous  delivery [O34.211]   Discharge Diagnosis: 1. Pregnancy at 39w4d - delivered       Procedures: 2017  - , Low Transverse       Hospital Course  Patient is a 32 y.o.  who at 39w4d had a  section.  Her postoperative course was without complications.  On POD #2 she was ready for discharge.  She had normal lochia and pain well controlled with oral medications.    Infant  male  fetus weighing 7 lb 11.1 oz (3490 g)   Apgars -  9  @ 1 minute /  9  @ 5 minutes.    Discharge labs  Lab Results   Component Value Date    WBC 9.83 07/15/2017    HGB 9.6 (L) 07/15/2017    HCT 29.1 (L) 07/15/2017     07/15/2017       Discharge Medications   Amanda Kennedy   Home Medication Instructions [facility]:[phone]    Printed on:17 0808   Medication Information                      ibuprofen (ADVIL,MOTRIN) 800 MG tablet  Take 1 tablet by mouth Every 8 (Eight) Hours As Needed for Mild Pain (1-3) or Moderate Pain  (DO NOT START UNTIL COMPLETION OF TORADOL).             ipratropium (ATROVENT HFA) 17 MCG/ACT inhaler  Inhale 2 puffs 4 (Four) Times a Day.             lamoTRIgine (LaMICtal) 100 MG tablet  TAKE 1 TABLET BY MOUTH 2 TIMES DAILY             Loratadine (CLARITIN) 10 MG capsule  Take  by mouth Daily.             omeprazole (PriLOSEC) 20 MG capsule  Take 20 mg by mouth daily.             oxyCODONE-acetaminophen (PERCOCET) 5-325 MG per tablet  Take 1 tablet by mouth Every 6 (Six) Hours As Needed for Moderate Pain  for up to 8 days.             Prenatal  Vit-Fe Fumarate-FA (PRENATAL, CLASSIC, VITAMIN) 28-0.8 MG tablet tablet  Take 1 tablet by mouth Daily.                 Discharge Disposition Home or Self Care   Condition on Discharge: good   Follow-up: 2 weeks with MD Kaleigh Rich MD  7/17/2017

## 2017-07-17 NOTE — PLAN OF CARE
Problem: Patient Care Overview (Adult)  Goal: Plan of Care Review  Outcome: Ongoing (interventions implemented as appropriate)    17 0334   Coping/Psychosocial Response Interventions   Plan Of Care Reviewed With patient   Patient Care Overview   Progress progress toward functional goals as expected   Outcome Evaluation   Outcome Summary/Follow up Plan VSS. FF ML U1. ALT incison. Ambulating.       Goal: Adult Individualization and Mutuality  Outcome: Ongoing (interventions implemented as appropriate)  Goal: Discharge Needs Assessment  Outcome: Ongoing (interventions implemented as appropriate)    Problem: Anesthesia/Analgesia, Neuraxial (Obstetrics)  Goal: Signs and Symptoms of Listed Potential Problems Will be Absent or Manageable (Anesthesia/Analgesia, Neuraxial)  Outcome: Ongoing (interventions implemented as appropriate)    Problem: Postpartum ( Delivery) (Adult)  Goal: Signs and Symptoms of Listed Potential Problems Will be Absent or Manageable (Postpartum)  Outcome: Ongoing (interventions implemented as appropriate)

## 2017-07-18 ENCOUNTER — HOSPITAL ENCOUNTER (EMERGENCY)
Facility: HOSPITAL | Age: 32
Discharge: HOME | End: 2017-07-18
Attending: EMERGENCY MEDICINE | Admitting: EMERGENCY MEDICINE

## 2017-07-18 ENCOUNTER — HOSPITAL ENCOUNTER (OUTPATIENT)
Dept: LACTATION | Facility: HOSPITAL | Age: 32
Discharge: HOME | End: 2017-07-18

## 2017-07-18 VITALS
BODY MASS INDEX: 25.39 KG/M2 | DIASTOLIC BLOOD PRESSURE: 58 MMHG | TEMPERATURE: 98.2 F | HEIGHT: 66 IN | RESPIRATION RATE: 16 BRPM | OXYGEN SATURATION: 98 % | SYSTOLIC BLOOD PRESSURE: 138 MMHG | HEART RATE: 59 BPM | WEIGHT: 158 LBS

## 2017-07-18 DIAGNOSIS — R51.9 NONINTRACTABLE HEADACHE, UNSPECIFIED CHRONICITY PATTERN, UNSPECIFIED HEADACHE TYPE: Primary | ICD-10-CM

## 2017-07-18 PROCEDURE — 99283 EMERGENCY DEPT VISIT LOW MDM: CPT

## 2017-07-18 RX ORDER — ACETAMINOPHEN, ASPIRIN AND CAFFEINE 250; 250; 65 MG/1; MG/1; MG/1
2 TABLET, FILM COATED ORAL EVERY 6 HOURS PRN
Status: DISCONTINUED | OUTPATIENT
Start: 2017-07-18 | End: 2017-07-18 | Stop reason: HOSPADM

## 2017-07-18 RX ORDER — VALACYCLOVIR HYDROCHLORIDE 500 MG/1
500 TABLET, FILM COATED ORAL DAILY
COMMUNITY

## 2017-07-18 RX ADMIN — ACETAMINOPHEN, ASPIRIN, CAFFEINE 2 TABLET: 250; 250; 65 TABLET ORAL at 14:11

## 2017-07-18 NOTE — ED PROVIDER NOTES
Subjective headache  History of Present Illness  The pt presents today with complaint of headache.  Patient tells me that she went to her GYN to tell her about her spinal headache.  She states that sheis a spinal headache.  She has had one before in the past.  When I enter the room she sitting up and she stating to me that she has no stress, arms.  She is standing up showing me her postpartum belly.  She tells me that the headache is not there at this time.  She does experiences when she crosses her legs.  She said the pain travels up her back into her neck and then into her head.  The headache is not better when she lies down and she is not having any pain at the time she sitting up and speaking to me.  She denies any fevers chills or diffuse arthralgias or myalgias.  She tells me that she has had a spinal headache in the past that she knew what this one was.  Review of Systems   Neurological: Positive for headaches. Negative for seizures and speech difficulty.       Past Medical History:   Diagnosis Date   • Anxiety    • Asthma    • Basal cell carcinoma of cheek 2016    RIGHT CHEEK   • Depression    • GERD (gastroesophageal reflux disease)    • Herpes    • Hypoglycemia    • Nutcracker esophagus    • Pregnant    • Scarring of skin    • Trauma     ptsd       Allergies   Allergen Reactions   • Cabbage Anaphylaxis   • Shellfish-Derived Products Anaphylaxis   • Banana      Unsure - she has just been told to avoid this food    • Cantaloupe (Diagnostic)      unsure   • Flagyl [Metronidazole]      Unknown reatcion   • Other      Poppy seed and any fish products - pt is unsure of reaction, she has just been told to stay away from these items    • Risperidone And Related Myalgia       Past Surgical History:   Procedure Laterality Date   • BREAST SURGERY      augmentation   •  SECTION      2   •  SECTION Bilateral 2017    Procedure:  SECTION REPEAT WITHOUT TUBAL LIGATION;  Surgeon: Kaleigh  Lucille Brown MD;  Location: [facility] LABOR DELIVERY;  Service:    • CHOLECYSTECTOMY     • MOLE REMOVAL      6   • WISDOM TOOTH EXTRACTION         History reviewed. No pertinent family history.    Social History     Social History   • Marital status:      Spouse name: N/A   • Number of children: N/A   • Years of education: N/A     Social History Main Topics   • Smoking status: Former Smoker     Packs/day: 0.00     Types: Cigarettes   • Smokeless tobacco: Never Used   • Alcohol use No   • Drug use: No   • Sexual activity: Yes     Partners: Male     Birth control/ protection: None     Other Topics Concern   • None     Social History Narrative           Objective   Physical Exam   Constitutional: She is oriented to person, place, and time. She appears well-developed and well-nourished.   HENT:   Head: Normocephalic and atraumatic.   Right Ear: External ear normal.   Left Ear: External ear normal.   Nose: Nose normal.   Mouth/Throat: Oropharynx is clear and moist.   Eyes: Conjunctivae and EOM are normal. Pupils are equal, round, and reactive to light. Right eye exhibits no discharge. Left eye exhibits no discharge.   Neck: Normal range of motion. Neck supple. No thyromegaly present.   Cardiovascular: Normal rate, regular rhythm, normal heart sounds and intact distal pulses.  Exam reveals no friction rub.    No murmur heard.  Pulmonary/Chest: Effort normal and breath sounds normal. No respiratory distress.   Abdominal: Soft. Bowel sounds are normal. She exhibits no distension. There is no tenderness.   Musculoskeletal: Normal range of motion. She exhibits no edema or deformity.   Neurological: She is alert and oriented to person, place, and time. She has normal reflexes. No cranial nerve deficit.   Skin: Skin is warm and dry. No rash noted.   Psychiatric: Judgment normal.   Nursing note and vitals reviewed.      Procedures         ED Course  ED Course   Comment By Time   The pt has been informed that this is  not likely a spinal headache.  She will be given a recommendation for excedrin migraine for any ongoing pain. Meryl Hobbs MD 07/18 1412                  MDM  Number of Diagnoses or Management Options  Nonintractable headache, unspecified chronicity pattern, unspecified headache type: new and requires workup  Risk of Complications, Morbidity, and/or Mortality  Presenting problems: moderate  Diagnostic procedures: moderate  Management options: moderate    Patient Progress  Patient progress: stable      Final diagnoses:   Nonintractable headache, unspecified chronicity pattern, unspecified headache type            Meryl Hobbs MD  07/18/17 3895

## 2017-07-18 NOTE — LACTATION NOTE
"Infant's Name: Pio Kennedy                                Infant's Date of Birth: [birth year]     G/P  3/4    Gestational age at Birth:39 wks 4 days   Infant's Age:4 days  Infant's Physician: Dr. Jessa Leiva                          Mom's Contact #: [phone]    Reason for Visit: weight check       Maternal Assessment:           Nipples: (x )Protractile (  ) Inverted  (   ) Flat   (   ) Traumatized            Breast:   (  )Elastic     (  x ) Inelastic   (  ) Soft Breasts   (   ) Softens with Feeds                             (   ) Easily Expresses Milk    (    X ) Engorged    Maternal History:     BF other children? no     Ages? 8 yrs, 4 yr old twins       How Long? 2 wks--pumping    Surgeries?   Augmentation after twins                                           Low Milk Supply?    \"dried up\" , not pumping often                  Infant's Birth weight: 7-11.1   (  3490  gms)  Discharge Weight 7-5.1  (  7-5.1 gms)            Wt Loss: 5.0 %    Today's Weight: 7-5.0 (  3318 gms)           Wt Loss:   4.9 %     Feeding History Since Discharge/Last  Appt.:Mother pumping about 4-5 times per day and giving EBM in a bottle. She gets 3-5 ounces with each session in only 8 mins. She also does direct BFing every 4-6 hours.     Last 24 hours: Urines: 6   Stools: 4        Color of Stool:green, brown    Pre Weight with Diaper:    7-5.0   ( 3318 gms)            Minutes  Right and Left  Breast:       25  mins  Post Weight:    7-6.5    ( 3358 gms)                                  Total Minutes:    25 mins          Total Weight Gain:       40     gms    Average Feeding Amount for Age: 30-45    Infant Assessment at Breast:   Appears:    ( x ) Alert          (   ) Sleepy                (   ) Irritable               Interested  ( x  )Great          (    ) Sucks w/ Stim    (   ) Little Interest  Suckle:       ( x  ) Coordinated (   ) Disorganized    (   )Tongue thrusts  (   )No Suckle    Tone:       ( x  ) Normal          (    ) " Hypotonic          (    ) Tense-Hyperextends  Skin:          ( x ) Pink             (  ) Jaundice          Goals:             Attain Optimal Infant latch-on/poitioning                          Promote increased stimulation, improve flow of breasts    Promote adequate infant nutrition and increased weight gain        Interventions: Observed BF session, minimal assistance required. Latch WNL attained, pain free. Assisted mother with pumping AFTER BF session. 40 mls obtained in 5 mins.    Education: [facility] Risk Line:  [phone] for medication questions-Pt on Klonopin, L3, read to pt verbatim from [facility] Mother's Milk regarding same. HANDS program info given as pt is living with her parents, but would like resources for finding own place and help with moving. Minimal support system. S/s mastitis, alleviating engorgement.       Feeding Plan:  Offer a BF session every 2-3 hours or sooner until past birth weight, then 4 hour intervals at night are okay. Pump right after a BFing session to relieve fullness as needed.    Evaluation:   Interventions accomplished satisfactorily, requires no further action      Regular Follow Up Appointment  with MD office with Dr. Leiva ([place])              Appointment Date:7/24/17                    Signature:ONIEL Arriaza     Faxed to:  Dr. eJssa Leiva   Date: 7/18/17     Time:4 PM

## 2019-08-30 NOTE — DISCHARGE INSTRUCTIONS
Recommend that you get some Excedrin Migraine over-the-counter likely help your headache because it does have caffeine in it.  If however U won a drink caffeine that would be acceptable also.  Please follow with your primary care physician next 24 hours if you are unable to do so or your symptoms worsen please return to the ER immediately for reevaluation.   pain    History reviewed. No pertinent past medical history. History reviewed. No pertinent surgical history. History reviewed. No pertinent family history. Social History     Socioeconomic History    Marital status: Single     Spouse name: None    Number of children: None    Years of education: None    Highest education level: None   Occupational History    None   Social Needs    Financial resource strain: None    Food insecurity:     Worry: None     Inability: None    Transportation needs:     Medical: None     Non-medical: None   Tobacco Use    Smoking status: Never Smoker    Smokeless tobacco: Never Used   Substance and Sexual Activity    Alcohol use: Never     Frequency: Never    Drug use: None    Sexual activity: None   Lifestyle    Physical activity:     Days per week: None     Minutes per session: None    Stress: None   Relationships    Social connections:     Talks on phone: None     Gets together: None     Attends Faith service: None     Active member of club or organization: None     Attends meetings of clubs or organizations: None     Relationship status: None    Intimate partner violence:     Fear of current or ex partner: None     Emotionally abused: None     Physically abused: None     Forced sexual activity: None   Other Topics Concern    None   Social History Narrative    None       No current outpatient medications on file. No current facility-administered medications for this visit. Allergies   Allergen Reactions    Shellfish-Derived Products Swelling       Review of Systems:  See above      Physical Exam:   Pulse 50   Resp 16   Ht 6' 1\" (1.854 m)   Wt 169 lb 12.8 oz (77 kg)   SpO2 96%   BMI 22.40 kg/m²        Gait is Normal.       Gen/Psych:Examination reveals a pleasant individual in no acute distress. The patient is oriented to time, place and person. The patient's mood and affect are appropriate. Patient appears well nourished.  Body habitus is

## 2023-03-15 NOTE — PROGRESS NOTES
"[facility]   Section Postpartum Delivery Progress Note    Subjective   Postpartum Day 1:  Delivery    The patient feels well.  Her pain is well controlled with prescribed pain medications.   She is ambulating well.  Patient describes her bleeding as moderate lochia.    Breastfeeding: without difficulty.    Objective     Vital Signs Range for the last 24 hours  Temperature: Temp:  [96.7 °F (35.9 °C)-98.1 °F (36.7 °C)] 98.1 °F (36.7 °C)   Temp Source: Temp src: Oral   BP: BP: (119-142)/(62-89) 142/70   Pulse: Heart Rate:  [66-85] 80   Respirations: Resp:  [16] 16   SPO2: SpO2:  [100 %] 100 %   O2 Amount (l/min):     O2 Devices O2 Device: room air   Weight:       Admit Height:  Height: 66\" (167.6 cm)      Physical Exam:  General:  no acute distresss.  Abdomen: Fundus: appropriate, firm, non tender  Extremities: normal, atraumatic, no cyanosis, and trace edema.   Incision: clean, dry & intact    Lab results reviewed:  Yes   Rubella:  No results found for: RUBELLAIGGIN Nurse Transcribed from prenatal record --  No components found for: EXTRUBELQUAL  Rh Status:    RH type   Date Value Ref Range Status   2017 Positive  Final     Immunizations: There is no immunization history for the selected administration types on file for this patient.    Assessment/Plan     Principal Problem:     delivery delivered  Active Problems:    Maternal care due to low transverse uterine scar from previous  delivery      Amanda Kennedy is Day 1  post-partum  , Low Transverse    .      Plan:  Continue current care.      Kaleigh Brown MD  7/15/2017  2:03 PM  "
"[facility]   Section Postpartum Delivery Progress Note    Subjective   Postpartum Day 2:  Delivery    The patient feels well.  Her pain is well controlled with prescribed pain medications.   She is ambulating well.  Patient describes her bleeding as thin lochia.    Breastfeeding: without difficulty.    Objective     Vital Signs Range for the last 24 hours  Temperature: Temp:  [97.4 °F (36.3 °C)-98.2 °F (36.8 °C)] 97.6 °F (36.4 °C)   Temp Source: Temp src: Temporal Artery    BP: BP: (117-142)/(60-76) 122/76   Pulse: Heart Rate:  [] 97   Respirations: Resp:  [16] 16   SPO2: SpO2:  [98 %-100 %] 98 %   O2 Amount (l/min):     O2 Devices O2 Device: room air   Weight:       Admit Height:  Height: 66\" (167.6 cm)      Physical Exam:  General:  no acute distresss.  Abdomen: Fundus: appropriate, firm, non tender  Extremities: normal, atraumatic, no cyanosis, and trace edema.   Incision: clean, dry & intact    Lab results reviewed:  Yes   Rubella:  No results found for: RUBELLAIGGIN Nurse Transcribed from prenatal record --  No components found for: EXTRUBELQUAL  Rh Status:    RH type   Date Value Ref Range Status   2017 Positive  Final     Immunizations: There is no immunization history for the selected administration types on file for this patient.    Assessment/Plan     Principal Problem:     delivery delivered  Active Problems:    Maternal care due to low transverse uterine scar from previous  delivery      Amanda Kennedy is Day 2  post-partum  , Low Transverse    .      Plan:  Continue current care.      Kaleigh Brown MD  2017  5:56 AM  "
"[facility]   Section Postpartum Delivery Progress Note    Subjective   Postpartum Day 3:  Delivery    The patient feels well.  Her pain is well controlled with prescribed pain medications.   She is ambulating well.  Patient describes her bleeding as no bleeding.    Breastfeeding: without difficulty.    Objective     Vital Signs Range for the last 24 hours  Temperature: Temp:  [97.7 °F (36.5 °C)-98.7 °F (37.1 °C)] 98.7 °F (37.1 °C)   Temp Source: Temp src: Temporal Artery    BP: BP: (115-134)/(67-71) 119/71   Pulse: Heart Rate:  [67-89] 89   Respirations: Resp:  [16-18] 18   SPO2: SpO2:  [97 %-100 %] 98 %   O2 Amount (l/min):     O2 Devices O2 Device: room air   Weight:       Admit Height:  Height: 66\" (167.6 cm)      Physical Exam:  General:  no acute distresss.  Abdomen: Fundus: appropriate, firm, non tender  Extremities: normal, atraumatic, no cyanosis, and trace edema.   Incision: clean, dry & intact    Lab results reviewed:  Yes   Rubella:  No results found for: RUBELLAIGGIN Nurse Transcribed from prenatal record --  No components found for: EXTRUBELQUAL  Rh Status:  No results found for: RH  Immunizations: There is no immunization history for the selected administration types on file for this patient.    Assessment/Plan     Principal Problem:     delivery delivered  Active Problems:    Maternal care due to low transverse uterine scar from previous  delivery      Amanda Kennedy is Day 3  post-partum  , Low Transverse    .      Plan:  Discharge home with standard precautions and return to clinic in 4-6 weeks.      Kaleigh Brown MD  2017  8:06 AM  "
4 = No assist / stand by assistance

## 2024-10-09 ENCOUNTER — OFFICE VISIT (OUTPATIENT)
Dept: FAMILY MEDICINE CLINIC | Facility: CLINIC | Age: 39
End: 2024-10-09
Payer: MEDICAID

## 2024-10-09 VITALS
BODY MASS INDEX: 20.67 KG/M2 | DIASTOLIC BLOOD PRESSURE: 77 MMHG | OXYGEN SATURATION: 96 % | HEART RATE: 85 BPM | SYSTOLIC BLOOD PRESSURE: 118 MMHG | TEMPERATURE: 97.3 F | HEIGHT: 66 IN | WEIGHT: 128.6 LBS

## 2024-10-09 DIAGNOSIS — R53.83 FATIGUE, UNSPECIFIED TYPE: ICD-10-CM

## 2024-10-09 DIAGNOSIS — F43.10 PTSD (POST-TRAUMATIC STRESS DISORDER): Chronic | ICD-10-CM

## 2024-10-09 DIAGNOSIS — E34.9 HORMONE IMBALANCE: ICD-10-CM

## 2024-10-09 DIAGNOSIS — F33.2 SEVERE EPISODE OF RECURRENT MAJOR DEPRESSIVE DISORDER, WITHOUT PSYCHOTIC FEATURES: Chronic | ICD-10-CM

## 2024-10-09 DIAGNOSIS — Z76.89 ENCOUNTER TO ESTABLISH CARE: Primary | ICD-10-CM

## 2024-10-09 DIAGNOSIS — Z13.220 LIPID SCREENING: ICD-10-CM

## 2024-10-09 DIAGNOSIS — R23.3 ABNORMAL BRUISING: ICD-10-CM

## 2024-10-09 DIAGNOSIS — F41.1 GENERALIZED ANXIETY DISORDER: Chronic | ICD-10-CM

## 2024-10-09 DIAGNOSIS — Z11.59 ENCOUNTER FOR HEPATITIS C SCREENING TEST FOR LOW RISK PATIENT: ICD-10-CM

## 2024-10-09 PROCEDURE — 99204 OFFICE O/P NEW MOD 45 MIN: CPT | Performed by: NURSE PRACTITIONER

## 2024-10-09 PROCEDURE — 1126F AMNT PAIN NOTED NONE PRSNT: CPT | Performed by: NURSE PRACTITIONER

## 2024-10-09 RX ORDER — SERTRALINE HYDROCHLORIDE 100 MG/1
150 TABLET, FILM COATED ORAL DAILY
COMMUNITY

## 2024-10-09 RX ORDER — DIAZEPAM 2 MG
2 TABLET ORAL EVERY 6 HOURS PRN
COMMUNITY

## 2024-10-09 NOTE — PROGRESS NOTES
CC:   Chief Complaint   Patient presents with    Establish Care     Patient wants labs checked for hormones, also noticing a lot of bruising        History:  Amanda Kennedy is a 39 y.o. female who presents today for evaluation of the above problems.      HPI  Patient presents to [facility]. I am familiar with patient.   Patient has been going to psychiatry in [place], but is wanting to find someone to help with medication management in this area.  Patient has PTSD, severe depression, and anxiety.  States she has derealization disorder and trying to do things to improve this. Takes Valium daily for severe anxiety. Wants referral to psych closer to home.   Patient believes some of her mental health problems are related to hormones being off balanced. She has been going to GYN in [place] and they have monitored some of her hormones. Patient asking for labs to be done today. Has been to endocrinology as well- they are wanting an AM cortisol level to evaluate her extreme fatigue. Patient reports her mood and mental health is worse at certain parts of her menstrual cycle. LMP- October 1.       Bruising- abnormal for her- small things like scratching leg will bruise it. Reports bruising mainly to legs. Has hx of iron deficiency anemia- wants labs checked.       Allergies   Allergen Reactions    Cabbage Anaphylaxis    Shellfish-Derived Products Anaphylaxis    Sulfa Antibiotics Anaphylaxis    Banana Unknown - Low Severity     Unsure - she has just been told to avoid this food     Cantaloupe (Diagnostic) Unknown - Low Severity     unsure    Flagyl [Metronidazole] Unknown - Low Severity     Unknown reatcion    Other Unknown - Low Severity     Poppy seed and any fish products - pt is unsure of reaction, she has just been told to stay away from these items     Risperidone And Related Myalgia     Past Medical History:   Diagnosis Date    Anxiety     Asthma     Basal cell carcinoma of cheek 09/21/2016    RIGHT  "CHEEK    Depression     GERD (gastroesophageal reflux disease)     Herpes     Hypoglycemia     Nutcracker esophagus     Pregnant     PTSD (post-traumatic stress disorder)     Scarring of skin     Trauma     ptsd     Past Surgical History:   Procedure Laterality Date    BREAST SURGERY      augmentation     SECTION      2     SECTION Bilateral 2017    Procedure:  SECTION REPEAT WITHOUT TUBAL LIGATION;  Surgeon: Kaleigh Brown MD;  Location: [facility] LABOR DELIVERY;  Service:     CHOLECYSTECTOMY      MOLE REMOVAL      6    WISDOM TOOTH EXTRACTION       History reviewed. No pertinent family history.   reports that she quit smoking about 3 years ago. Her smoking use included cigarettes. She started smoking about 15 years ago. She has a 12 pack-year smoking history. She has been exposed to tobacco smoke. She has never used smokeless tobacco. She reports that she does not drink alcohol and does not use drugs.      Current Outpatient Medications:     diazePAM (VALIUM) 2 MG tablet, Take 1 tablet by mouth Every 6 (Six) Hours As Needed for Anxiety., Disp: , Rfl:     MAGNESIUM PO, Take 2 tablets by mouth Every Night., Disp: , Rfl:     PROGESTERONE PO, Take 50 mg by mouth Daily. Compounded medication, Disp: , Rfl:     sertraline (ZOLOFT) 100 MG tablet, Take 1.5 tablets by mouth Daily., Disp: , Rfl:     OBJECTIVE:  /77 (BP Location: Left arm, Patient Position: Sitting, Cuff Size: Adult)   Pulse 85   Temp 97.3 °F (36.3 °C) (Temporal)   Ht 167.6 cm (66\")   Wt 58.3 kg (128 lb 9.6 oz)   LMP 10/01/2024   SpO2 96%   Breastfeeding No   BMI 20.76 kg/m²    Estimated body mass index is 20.76 kg/m² as calculated from the following:    Height as of this encounter: 167.6 cm (66\").    Weight as of this encounter: 58.3 kg (128 lb 9.6 oz).   BMI is within normal parameters. No other follow-up for BMI required.             Physical Exam  Vitals reviewed.   Constitutional:       General: She is not " in acute distress.     Appearance: Normal appearance.   HENT:      Head: Normocephalic and atraumatic.   Cardiovascular:      Rate and Rhythm: Normal rate and regular rhythm.      Heart sounds: Normal heart sounds.   Pulmonary:      Effort: No respiratory distress.      Breath sounds: Normal breath sounds. No wheezing.   Musculoskeletal:         General: Normal range of motion.      Cervical back: Normal range of motion.   Lymphadenopathy:      Cervical: No cervical adenopathy.   Skin:     General: Skin is warm and dry.   Neurological:      Mental Status: She is alert and oriented to person, place, and time.   Psychiatric:         Mood and Affect: Mood normal.         Speech: Speech normal.         Behavior: Behavior normal.         Thought Content: Thought content normal.              Assessment/Plan    Diagnoses and all orders for this visit:    1. Encounter to establish care (Primary)    2. Abnormal bruising    3. Fatigue, unspecified type  -     CBC & Differential  -     Comprehensive Metabolic Panel  -     Vitamin B12  -     Iron Profile  -     Ferritin  -     Vitamin D,25-Hydroxy  -     TSH  -     Cortisol - AM    4. Generalized anxiety disorder  -     Ambulatory Referral to Psychiatry    5. PTSD (post-traumatic stress disorder)  -     Ambulatory Referral to Psychiatry    6. Severe episode of recurrent major depressive disorder, without psychotic features  -     Ambulatory Referral to Psychiatry    7. Hormone imbalance  -     Estrogens, Total  -     Progesterone  -     Testosterone    8. Encounter for hepatitis C screening test for low risk patient  -     Hepatitis C Antibody    9. Lipid screening  -     Lipid Panel      Labs to evaluate further. F/u 1 month. Will send Cortisol level to endocrinology.   Encouraged f/u with GYN for concerns about hormones.           An After Visit Summary was printed and given to the patient at discharge.  Return in about 4 weeks (around 11/6/2024) for Recheck anxiety and  bruising.

## 2024-10-10 LAB
25(OH)D3+25(OH)D2 SERPL-MCNC: 36.2 NG/ML (ref 30–100)
ALBUMIN SERPL-MCNC: 4.3 G/DL (ref 3.5–5.2)
ALBUMIN/GLOB SERPL: 1.7 G/DL
ALP SERPL-CCNC: 73 U/L (ref 39–117)
ALT SERPL-CCNC: 84 U/L (ref 1–33)
AST SERPL-CCNC: 63 U/L (ref 1–32)
BASOPHILS # BLD AUTO: 0.05 10*3/MM3 (ref 0–0.2)
BASOPHILS NFR BLD AUTO: 1.1 % (ref 0–1.5)
BILIRUB SERPL-MCNC: 0.5 MG/DL (ref 0–1.2)
BUN SERPL-MCNC: 11 MG/DL (ref 6–20)
BUN/CREAT SERPL: 18 (ref 7–25)
CALCIUM SERPL-MCNC: 9 MG/DL (ref 8.6–10.5)
CHLORIDE SERPL-SCNC: 104 MMOL/L (ref 98–107)
CHOLEST SERPL-MCNC: 258 MG/DL (ref 0–200)
CO2 SERPL-SCNC: 23.6 MMOL/L (ref 22–29)
CORTIS AM PEAK SERPL-MCNC: 10 UG/DL (ref 6.2–19.4)
CREAT SERPL-MCNC: 0.61 MG/DL (ref 0.57–1)
EGFRCR SERPLBLD CKD-EPI 2021: 117.5 ML/MIN/1.73
EOSINOPHIL # BLD AUTO: 0.07 10*3/MM3 (ref 0–0.4)
EOSINOPHIL NFR BLD AUTO: 1.6 % (ref 0.3–6.2)
ERYTHROCYTE [DISTWIDTH] IN BLOOD BY AUTOMATED COUNT: 12.8 % (ref 12.3–15.4)
ESTROGEN SERPL-MCNC: 268 PG/ML
FERRITIN SERPL-MCNC: 14.3 NG/ML (ref 13–150)
GLOBULIN SER CALC-MCNC: 2.5 GM/DL
GLUCOSE SERPL-MCNC: 94 MG/DL (ref 65–99)
HCT VFR BLD AUTO: 40 % (ref 34–46.6)
HCV IGG SERPL QL IA: NON REACTIVE
HDLC SERPL-MCNC: 81 MG/DL (ref 40–60)
HGB BLD-MCNC: 12.9 G/DL (ref 12–15.9)
IMM GRANULOCYTES # BLD AUTO: 0.01 10*3/MM3 (ref 0–0.05)
IMM GRANULOCYTES NFR BLD AUTO: 0.2 % (ref 0–0.5)
IRON SATN MFR SERPL: 18 % (ref 20–50)
IRON SERPL-MCNC: 89 MCG/DL (ref 37–145)
LDLC SERPL CALC-MCNC: 165 MG/DL (ref 0–100)
LYMPHOCYTES # BLD AUTO: 1.26 10*3/MM3 (ref 0.7–3.1)
LYMPHOCYTES NFR BLD AUTO: 28.8 % (ref 19.6–45.3)
MCH RBC QN AUTO: 30.4 PG (ref 26.6–33)
MCHC RBC AUTO-ENTMCNC: 32.3 G/DL (ref 31.5–35.7)
MCV RBC AUTO: 94.3 FL (ref 79–97)
MONOCYTES # BLD AUTO: 0.19 10*3/MM3 (ref 0.1–0.9)
MONOCYTES NFR BLD AUTO: 4.3 % (ref 5–12)
NEUTROPHILS # BLD AUTO: 2.79 10*3/MM3 (ref 1.7–7)
NEUTROPHILS NFR BLD AUTO: 64 % (ref 42.7–76)
NRBC BLD AUTO-RTO: 0 /100 WBC (ref 0–0.2)
PLATELET # BLD AUTO: 217 10*3/MM3 (ref 140–450)
POTASSIUM SERPL-SCNC: 4.1 MMOL/L (ref 3.5–5.2)
PROGEST SERPL-MCNC: 0.3 NG/ML
PROT SERPL-MCNC: 6.8 G/DL (ref 6–8.5)
RBC # BLD AUTO: 4.24 10*6/MM3 (ref 3.77–5.28)
SODIUM SERPL-SCNC: 136 MMOL/L (ref 136–145)
TESTOST SERPL-MCNC: <3 NG/DL (ref 8–60)
TIBC SERPL-MCNC: 487 MCG/DL
TRIGL SERPL-MCNC: 73 MG/DL (ref 0–150)
TSH SERPL DL<=0.005 MIU/L-ACNC: 1.33 UIU/ML (ref 0.27–4.2)
UIBC SERPL-MCNC: 398 MCG/DL (ref 112–346)
VIT B12 SERPL-MCNC: 783 PG/ML (ref 211–946)
VLDLC SERPL CALC-MCNC: 12 MG/DL (ref 5–40)
WBC # BLD AUTO: 4.37 10*3/MM3 (ref 3.4–10.8)

## 2024-10-15 ENCOUNTER — TELEPHONE (OUTPATIENT)
Dept: FAMILY MEDICINE CLINIC | Facility: CLINIC | Age: 39
End: 2024-10-15
Payer: MEDICAID

## 2024-10-15 NOTE — TELEPHONE ENCOUNTER
Caller: Amanda Kennedy    Relationship: Self    Best call back number: [phone]     What is the best time to reach you: ANY    Who are you requesting to speak with (clinical staff, provider,  specific staff member): NONE SPECIFIED     What was the call regarding:     PATIENT IS WONDERING IF THE OFFICE HAS RECEIVED A REQUEST FOR LAB RESULTS FROM [facility].    PATIENT IS ALSO WANTING LAB RESULTS SENT TO HER OBGYN. PLEASE FAX -[phone]. PATIENT HAS AN APPOINTMENT ON 10.16.24.     PATIENT IS ALSO WONDERING IF ALL OF HER LAB RESULTS WERE SENT TO DR. PLASCENCIA? PATIENT STATES HER CORTISOL LEVELS WERE, BUT IS UNSURE IF ALL RESULTS WERE SENT.     Is it okay if the provider responds through [employer]: PLEASE CALL

## 2024-10-15 NOTE — TELEPHONE ENCOUNTER
Caller: Amanda Kennedy    Relationship: Self    Best call back number: [phone]    What is the best time to reach you: SOON PLEASE     Who are you requesting to speak with (clinical staff, provider,  specific staff member): PROVIDER OR CLINICAL STAFF         What was the call regarding: PATIENT REQUESTING A CALL BACK TO DISCUSS   IF THERE IS ANYONE LOCALLY THAT CAN PRESCRIBE TESTOSTERONE AND/ OR TREAT HORMONAL ISSUES  PATIENT STATES THE DRIVE THE [place] AND THAT IS JUST GETTING TOO HARD TO DO   AND ALSO CAN PCP PRESCRIBE VALIUM AND ZOLOFT  TEMPORARILY UNTIL PATIENT COULD GET MENTAL HEALTH PROVIDER CLOSER TO HOME.  PATIENT HAS APPOINTMENT IN [place] TOMORROW BUT STATES IF SHE FEELS LIKE SHE DOES TODAY OR WORSE KNOWS SHE WILL NOT BE ABLE TO KEEP APPOINTMENT AND THEY DO NOT OFFER TELEHEALTH      Is it okay if the provider responds through MyChart: PREFERS A CALL BACK

## 2024-10-15 NOTE — TELEPHONE ENCOUNTER
There is a provider in [place]- Dr. Anna D'Amico who is a urologist but also does hormone replacement. I know she has helped one of my patient's with the testosterone pellets that are implanted. She could call and see if they will see her for that.   I would prefer her to stay with mental health provider for her valium. I can prescribe the Zoloft. If she can't get to appt there- she will need to be seen here in order for me to send in Valium- would need UDS and contract. If I did this- it would only be to bridge her until she is seen by psych locally.

## 2024-10-16 NOTE — TELEPHONE ENCOUNTER
Pt calling and said lab results never received...provided another fax number [phone]. sent via epic.

## 2024-11-12 ENCOUNTER — OFFICE VISIT (OUTPATIENT)
Dept: FAMILY MEDICINE CLINIC | Facility: CLINIC | Age: 39
End: 2024-11-12
Payer: MEDICAID

## 2024-11-12 VITALS
TEMPERATURE: 97.8 F | HEIGHT: 66 IN | DIASTOLIC BLOOD PRESSURE: 64 MMHG | OXYGEN SATURATION: 98 % | WEIGHT: 128 LBS | HEART RATE: 67 BPM | BODY MASS INDEX: 20.57 KG/M2 | SYSTOLIC BLOOD PRESSURE: 94 MMHG

## 2024-11-12 DIAGNOSIS — E78.00 PURE HYPERCHOLESTEROLEMIA: ICD-10-CM

## 2024-11-12 DIAGNOSIS — F41.1 GENERALIZED ANXIETY DISORDER: ICD-10-CM

## 2024-11-12 DIAGNOSIS — R79.89 LOW TESTOSTERONE LEVEL IN FEMALE: ICD-10-CM

## 2024-11-12 DIAGNOSIS — R74.8 ELEVATED LIVER ENZYMES: Primary | ICD-10-CM

## 2024-11-12 NOTE — PROGRESS NOTES
CC:   Chief Complaint   Patient presents with    Follow-up     1 month follow up elevated liver enzymes, hyperlipidemia  and cortisol, fasting        History:  Amanda Kennedy is a 39 y.o. female who presents today for evaluation of the above problems.      HPI    Patient presents for follow-up on elevated liver enzymes and high cholesterol.  Will repeat labs today, she is fasting.  Patient admits to eating an unhealthy diet.  States she does eat a lot of fried, fatty foods.  Believes her father has high cholesterol as well.    Patient has low testosterone and has started seeing a provider to help with hormone replacement.  States she was started on testosterone gel about 14 days ago and can tell a difference and improvement with her anxiety levels.  She also feels like she has more muscle mass.  Patient typically has anxiety when driving and has affected her ability to get to appointments.  States she was able to drive to [place] with no issues.  Patient would like testosterone levels rechecked so she can send to her provider who manages hormone replacement.  [phone] -Christy Michael    Patient states her pap is up to date- she goes to GYN in [facility]- Will request those records.     Allergies   Allergen Reactions    Cabbage Anaphylaxis    Shellfish-Derived Products Anaphylaxis    Sulfa Antibiotics Anaphylaxis    Banana Unknown - Low Severity     Unsure - she has just been told to avoid this food     Cantaloupe (Diagnostic) Unknown - Low Severity     unsure    Flagyl [Metronidazole] Unknown - Low Severity     Unknown reatcion    Other Unknown - Low Severity     Poppy seed and any fish products - pt is unsure of reaction, she has just been told to stay away from these items     Risperidone And Related Myalgia     Past Medical History:   Diagnosis Date    Anxiety     Asthma     Basal cell carcinoma of cheek 09/21/2016    RIGHT CHEEK    Depression     GERD (gastroesophageal  "reflux disease)     Herpes     Hypoglycemia     Nutcracker esophagus     Pregnant     PTSD (post-traumatic stress disorder)     Scarring of skin     Trauma     ptsd     Past Surgical History:   Procedure Laterality Date    BREAST SURGERY      augmentation     SECTION      2     SECTION Bilateral 2017    Procedure:  SECTION REPEAT WITHOUT TUBAL LIGATION;  Surgeon: Kaeligh Brown MD;  Location: [facility] LABOR DELIVERY;  Service:     CHOLECYSTECTOMY      MOLE REMOVAL      6    WISDOM TOOTH EXTRACTION       History reviewed. No pertinent family history.   reports that she quit smoking about 3 years ago. Her smoking use included cigarettes. She started smoking about 15 years ago. She has a 12 pack-year smoking history. She has been exposed to tobacco smoke. She has never used smokeless tobacco. She reports that she does not drink alcohol and does not use drugs.      Current Outpatient Medications:     diazePAM (VALIUM) 2 MG tablet, Take 1 tablet by mouth Every 6 (Six) Hours As Needed for Anxiety., Disp: , Rfl:     MAGNESIUM PO, Take 2 tablets by mouth Every Night., Disp: , Rfl:     PROGESTERONE PO, Take 100 mg by mouth Daily. Compounded medication - 100 mg/ml 1 ml daily, Disp: , Rfl:     sertraline (ZOLOFT) 100 MG tablet, Take 1.5 tablets by mouth Daily. (Patient taking differently: Take 1 tablet by mouth Daily.), Disp: , Rfl:     TESTOSTERONE COMPOUNDING KIT TD, Compound 10 mg/ml 1-4 drops daily, Disp: , Rfl:     OBJECTIVE:  BP 94/64 (BP Location: Left arm, Patient Position: Sitting, Cuff Size: Adult)   Pulse 67   Temp 97.8 °F (36.6 °C) (Temporal)   Ht 167.6 cm (66\")   Wt 58.1 kg (128 lb)   SpO2 98%   BMI 20.66 kg/m²    Estimated body mass index is 20.66 kg/m² as calculated from the following:    Height as of this encounter: 167.6 cm (66\").    Weight as of this encounter: 58.1 kg (128 lb).   BMI is within normal parameters. No other follow-up for BMI required.   "           Physical Exam  Vitals reviewed.   Constitutional:       General: She is not in acute distress.     Appearance: Normal appearance.   HENT:      Head: Normocephalic and atraumatic.   Cardiovascular:      Rate and Rhythm: Normal rate and regular rhythm.      Heart sounds: Normal heart sounds.   Pulmonary:      Effort: No respiratory distress.      Breath sounds: Normal breath sounds. No wheezing.   Musculoskeletal:         General: Normal range of motion.   Skin:     General: Skin is warm and dry.   Neurological:      Mental Status: She is alert and oriented to person, place, and time.   Psychiatric:         Mood and Affect: Mood normal.         Behavior: Behavior normal.         Thought Content: Thought content normal.              Assessment/Plan    Diagnoses and all orders for this visit:    1. Elevated liver enzymes (Primary)  -     Comprehensive Metabolic Panel    2. Pure hypercholesterolemia  -     Lipid Panel    3. Low testosterone level in female  -     Testosterone, Free, Total    4. Generalized anxiety disorder    Will forward testosterone levels to provider who manages her testosterone gel  Discussed mediterranean diet.             An After Visit Summary was printed and given to the patient at discharge.  Return in about 3 months (around 2/12/2025) for Recheck cholesterol , Recheck.

## 2024-11-16 LAB
ALBUMIN SERPL-MCNC: 4.4 G/DL (ref 3.5–5.2)
ALBUMIN/GLOB SERPL: 1.8 G/DL
ALP SERPL-CCNC: 62 U/L (ref 39–117)
ALT SERPL-CCNC: 18 U/L (ref 1–33)
AST SERPL-CCNC: 17 U/L (ref 1–32)
BILIRUB SERPL-MCNC: 0.3 MG/DL (ref 0–1.2)
BUN SERPL-MCNC: 9 MG/DL (ref 6–20)
BUN/CREAT SERPL: 13.4 (ref 7–25)
CALCIUM SERPL-MCNC: 9.2 MG/DL (ref 8.6–10.5)
CHLORIDE SERPL-SCNC: 105 MMOL/L (ref 98–107)
CHOLEST SERPL-MCNC: 207 MG/DL (ref 0–200)
CO2 SERPL-SCNC: 23.5 MMOL/L (ref 22–29)
CREAT SERPL-MCNC: 0.67 MG/DL (ref 0.57–1)
EGFRCR SERPLBLD CKD-EPI 2021: 114.9 ML/MIN/1.73
GLOBULIN SER CALC-MCNC: 2.4 GM/DL
GLUCOSE SERPL-MCNC: 92 MG/DL (ref 65–99)
HDLC SERPL-MCNC: 73 MG/DL (ref 40–60)
LDLC SERPL CALC-MCNC: 121 MG/DL (ref 0–100)
POTASSIUM SERPL-SCNC: 4.1 MMOL/L (ref 3.5–5.2)
PROT SERPL-MCNC: 6.8 G/DL (ref 6–8.5)
SODIUM SERPL-SCNC: 138 MMOL/L (ref 136–145)
TESTOST FREE SERPL-MCNC: 0.6 PG/ML (ref 0–4.2)
TESTOST SERPL-MCNC: 23 NG/DL (ref 8–60)
TRIGL SERPL-MCNC: 74 MG/DL (ref 0–150)
VLDLC SERPL CALC-MCNC: 13 MG/DL (ref 5–40)

## 2024-12-09 ENCOUNTER — TELEPHONE (OUTPATIENT)
Dept: FAMILY MEDICINE CLINIC | Facility: CLINIC | Age: 39
End: 2024-12-09
Payer: MEDICAID

## 2024-12-09 RX ORDER — VALACYCLOVIR HYDROCHLORIDE 1 G/1
1000 TABLET, FILM COATED ORAL 2 TIMES DAILY
Qty: 14 TABLET | Refills: 0 | Status: SHIPPED | OUTPATIENT
Start: 2024-12-09 | End: 2024-12-16

## 2024-12-09 NOTE — TELEPHONE ENCOUNTER
Caller: Amanda Kennedy    Relationship: Self    Best call back number: [phone],    What medication are you requesting: VALTREX 1000MG    What are your current symptoms: VAGINAL HERPES    How long have you been experiencing symptoms: A LITTLE OVER A WEEK     Have you had these symptoms before:    [x] Yes  [] No    Have you been treated for these symptoms before:   [x] Yes  [] No    If a prescription is needed, what is your preferred pharmacy and phone number:  [facility] [address] [place] [address] 62 [facility] [phone] [facility] [phone]

## 2024-12-30 ENCOUNTER — TELEPHONE (OUTPATIENT)
Dept: FAMILY MEDICINE CLINIC | Facility: CLINIC | Age: 39
End: 2024-12-30

## 2024-12-30 NOTE — TELEPHONE ENCOUNTER
Caller: Natan Kennedy    Relationship: Self    Best call back number:  [phone]    What form or medical record are you requesting:     LABS FROM LAST OFFICE VISIT    Who is requesting this form or medical record from you:     NATAN    How would you like to receive the form or medical records     F- [phone]    [facility]    Timeframe paperwork needed:     ASAP    PLEASE ADVISE PATIENT WHEN COMPLETED

## 2024-12-30 NOTE — TELEPHONE ENCOUNTER
Caller: Natan Kennedy    Relationship: Self    Best call back number: [phone]    Who are you requesting to speak with (clinical staff, provider,  specific staff member):     CLINICAL STAFF    Do you know the name of the person who called:     NATAN    What was the call regarding:     PATIENT WOULD LIKE TO RETAKE LABS AGAIN IN 2 WEEKS    PLEASE CALL AND ADVISE THAT THIS CAN BE DONE

## 2024-12-31 DIAGNOSIS — Z13.220 LIPID SCREENING: ICD-10-CM

## 2024-12-31 DIAGNOSIS — E34.9 HORMONE IMBALANCE: ICD-10-CM

## 2024-12-31 DIAGNOSIS — E55.9 VITAMIN D DEFICIENCY: ICD-10-CM

## 2024-12-31 DIAGNOSIS — R23.3 ABNORMAL BRUISING: ICD-10-CM

## 2024-12-31 DIAGNOSIS — R53.83 FATIGUE, UNSPECIFIED TYPE: ICD-10-CM

## 2024-12-31 DIAGNOSIS — E53.8 VITAMIN B12 DEFICIENCY: ICD-10-CM

## 2024-12-31 DIAGNOSIS — E78.00 PURE HYPERCHOLESTEROLEMIA: ICD-10-CM

## 2024-12-31 DIAGNOSIS — R79.89 LOW TESTOSTERONE LEVEL IN FEMALE: Primary | ICD-10-CM

## 2024-12-31 DIAGNOSIS — R74.8 ELEVATED LIVER ENZYMES: ICD-10-CM

## 2024-12-31 NOTE — TELEPHONE ENCOUNTER
Called patient and she needs all her labs reordered for Christy Michael (fax number in blue sticky note)

## 2025-02-12 ENCOUNTER — OFFICE VISIT (OUTPATIENT)
Dept: FAMILY MEDICINE CLINIC | Facility: CLINIC | Age: 40
End: 2025-02-12
Payer: MEDICAID

## 2025-02-12 VITALS
DIASTOLIC BLOOD PRESSURE: 78 MMHG | HEIGHT: 66 IN | OXYGEN SATURATION: 95 % | BODY MASS INDEX: 21.24 KG/M2 | SYSTOLIC BLOOD PRESSURE: 114 MMHG | TEMPERATURE: 98.4 F | WEIGHT: 132.2 LBS | HEART RATE: 89 BPM

## 2025-02-12 DIAGNOSIS — F43.10 PTSD (POST-TRAUMATIC STRESS DISORDER): ICD-10-CM

## 2025-02-12 DIAGNOSIS — F41.1 GENERALIZED ANXIETY DISORDER: Primary | ICD-10-CM

## 2025-02-12 DIAGNOSIS — R79.89 LOW TESTOSTERONE: ICD-10-CM

## 2025-02-12 DIAGNOSIS — F32.A DEPRESSION, UNSPECIFIED DEPRESSION TYPE: ICD-10-CM

## 2025-02-12 DIAGNOSIS — R53.83 FATIGUE, UNSPECIFIED TYPE: ICD-10-CM

## 2025-02-12 PROCEDURE — 1159F MED LIST DOCD IN RCRD: CPT | Performed by: NURSE PRACTITIONER

## 2025-02-12 PROCEDURE — 1126F AMNT PAIN NOTED NONE PRSNT: CPT | Performed by: NURSE PRACTITIONER

## 2025-02-12 PROCEDURE — 1160F RVW MEDS BY RX/DR IN RCRD: CPT | Performed by: NURSE PRACTITIONER

## 2025-02-12 PROCEDURE — 99213 OFFICE O/P EST LOW 20 MIN: CPT | Performed by: NURSE PRACTITIONER

## 2025-02-12 RX ORDER — DIAZEPAM 5 MG/1
5 TABLET ORAL DAILY PRN
COMMUNITY

## 2025-02-12 NOTE — PROGRESS NOTES
CC:   Chief Complaint   Patient presents with    Primary Care Follow-Up     Follow up on hormones and elevated liver enzymes - fasting        History:  Amanda Kennedy is a 39 y.o. female who presents today for evaluation of the above problems.      HPI    Patient presents for f/u. She is still struggling with anxiety and handling emotions. States she is taking her Zoloft daily. Goes to psych in [place] but having trouble getting to appts due to anxiety while driving. At first anxiety while driving was better after starting testosterone but then the past couple of nights has woke up feeling very scared and emotional. States she stopped used the testosterone 4 days ago in fear it may be too high of a dose. She stopped her progesterone 2 days ago. Patient's GYN in [place]- Felicity Vazquez, manages her hormones along with a pharmacist- Christy Michael. Patient needs these labs faxed to her [phone].  Feels like her anxiety is more hormone related  .  Fasting.   LMP- 2025    Psychiatrist is prescribing her Valium- takes a total of 5 mg a day.   Does counseling weekly through zoom with a mental health counselor out of [place]. Patient is interested in a different psychiatrist to manage medications more virtually since driving is a trigger for her.     Allergies   Allergen Reactions    Sulfa Antibiotics Anaphylaxis    Flagyl [Metronidazole] Unknown - Low Severity     Unknown reatcion    Risperidone And Related Myalgia     Past Medical History:   Diagnosis Date    Anxiety     Asthma     Basal cell carcinoma of cheek 2016    RIGHT CHEEK    Depression     GERD (gastroesophageal reflux disease)     Herpes     Hypoglycemia     Nutcracker esophagus     Pregnant     PTSD (post-traumatic stress disorder)     Scarring of skin     Trauma     ptsd     Past Surgical History:   Procedure Laterality Date    BREAST SURGERY      augmentation     SECTION      2     SECTION Bilateral  "2017    Procedure:  SECTION REPEAT WITHOUT TUBAL LIGATION;  Surgeon: Kaleigh Brown MD;  Location: [facility] LABOR DELIVERY;  Service:     CHOLECYSTECTOMY      MOLE REMOVAL      6    WISDOM TOOTH EXTRACTION       History reviewed. No pertinent family history.   reports that she quit smoking about 4 years ago. Her smoking use included cigarettes. She started smoking about 16 years ago. She has a 12 pack-year smoking history. She has been exposed to tobacco smoke. She has never used smokeless tobacco. She reports that she does not drink alcohol and does not use drugs.      Current Outpatient Medications:     folic acid-vit B6-vit B12 (FOLBEE) 2.5-25-1 MG tablet tablet, Take  by mouth Daily., Disp: , Rfl:     MAGNESIUM PO, Take 2 tablets by mouth Every Night., Disp: , Rfl:     PROGESTERONE PO, Take 100 mg by mouth Daily. Compounded medication - 100 mg/ml 1 ml daily, Disp: , Rfl:     sertraline (ZOLOFT) 100 MG tablet, Take 1.5 tablets by mouth Daily. (Patient taking differently: Take 1 tablet by mouth Daily.), Disp: , Rfl:     TESTOSTERONE COMPOUNDING KIT TD, Compound 10 mg/ml 1-4 drops daily, Disp: , Rfl:     diazePAM (VALIUM) 5 MG tablet, Take 1 tablet by mouth Daily As Needed for Anxiety., Disp: , Rfl:     OBJECTIVE:  /78 (BP Location: Left arm, Patient Position: Sitting, Cuff Size: Adult)   Pulse 89   Temp 98.4 °F (36.9 °C) (Temporal)   Ht 167.6 cm (66\")   Wt 60 kg (132 lb 3.2 oz)   SpO2 95%   BMI 21.34 kg/m²    Estimated body mass index is 21.34 kg/m² as calculated from the following:    Height as of this encounter: 167.6 cm (66\").    Weight as of this encounter: 60 kg (132 lb 3.2 oz).   BMI is within normal parameters. No other follow-up for BMI required.             Physical Exam  Vitals reviewed.   Constitutional:       General: She is not in acute distress.     Appearance: Normal appearance.   HENT:      Head: Normocephalic and atraumatic.   Cardiovascular:      Rate and Rhythm: " Normal rate and regular rhythm.      Heart sounds: Normal heart sounds.   Pulmonary:      Effort: No respiratory distress.      Breath sounds: Normal breath sounds. No wheezing.   Abdominal:      General: Bowel sounds are normal.      Palpations: Abdomen is soft.      Tenderness: There is no abdominal tenderness.   Musculoskeletal:         General: Normal range of motion.   Skin:     General: Skin is warm and dry.   Neurological:      Mental Status: She is alert and oriented to person, place, and time.   Psychiatric:         Mood and Affect: Mood normal.         Behavior: Behavior normal.              Assessment/Plan    Diagnoses and all orders for this visit:    1. Generalized anxiety disorder (Primary)  -     Ambulatory Referral to Behavioral Health    2. PTSD (post-traumatic stress disorder)  -     Ambulatory Referral to Behavioral Health    3. Depression, unspecified depression type  -     Ambulatory Referral to Behavioral Health    4. Low testosterone    5. Fatigue, unspecified type    Referral placed psych evaluation and treatment.   Patient is doing labs today- will fax to provider managing hormones  Encouraged exercise and stress reducing measures.            An After Visit Summary was printed and given to the patient at discharge.  Return in about 4 months (around 6/12/2025) for Annual physical.

## 2025-02-14 ENCOUNTER — TELEPHONE (OUTPATIENT)
Dept: FAMILY MEDICINE CLINIC | Facility: CLINIC | Age: 40
End: 2025-02-14

## 2025-02-14 NOTE — TELEPHONE ENCOUNTER
Caller: Amanda Kennedy    Relationship: Self    Best call back number: [phone]     What is the best time to reach you: ANY    Who are you requesting to speak with (clinical staff, provider,  specific staff member): NONE SPECIFIED     What was the call regarding:     PATIENT CALLED TO CHECK ON THE STATUS OF HER RECENT TEST RESULTS.     Is it okay if the provider responds through MyChart: PLEASE CALL

## 2025-03-24 ENCOUNTER — OFFICE VISIT (OUTPATIENT)
Dept: FAMILY MEDICINE CLINIC | Facility: CLINIC | Age: 40
End: 2025-03-24
Payer: MEDICAID

## 2025-03-24 VITALS
OXYGEN SATURATION: 98 % | BODY MASS INDEX: 21.21 KG/M2 | HEIGHT: 66 IN | RESPIRATION RATE: 18 BRPM | TEMPERATURE: 97.6 F | DIASTOLIC BLOOD PRESSURE: 78 MMHG | HEART RATE: 18 BPM | SYSTOLIC BLOOD PRESSURE: 112 MMHG | WEIGHT: 132 LBS

## 2025-03-24 DIAGNOSIS — E78.00 PURE HYPERCHOLESTEROLEMIA: ICD-10-CM

## 2025-03-24 DIAGNOSIS — R51.9 ACUTE NONINTRACTABLE HEADACHE, UNSPECIFIED HEADACHE TYPE: ICD-10-CM

## 2025-03-24 DIAGNOSIS — R07.89 LEFT CHEST PRESSURE: ICD-10-CM

## 2025-03-24 DIAGNOSIS — R20.0 ARM NUMBNESS LEFT: ICD-10-CM

## 2025-03-24 DIAGNOSIS — R53.83 FATIGUE, UNSPECIFIED TYPE: ICD-10-CM

## 2025-03-24 DIAGNOSIS — R63.1 EXCESSIVE THIRST: ICD-10-CM

## 2025-03-24 DIAGNOSIS — R42 DIZZINESS: Primary | ICD-10-CM

## 2025-03-24 DIAGNOSIS — R79.0 LOW FERRITIN: ICD-10-CM

## 2025-03-24 PROCEDURE — 93000 ELECTROCARDIOGRAM COMPLETE: CPT | Performed by: NURSE PRACTITIONER

## 2025-03-24 PROCEDURE — 99214 OFFICE O/P EST MOD 30 MIN: CPT | Performed by: NURSE PRACTITIONER

## 2025-03-24 PROCEDURE — 1160F RVW MEDS BY RX/DR IN RCRD: CPT | Performed by: NURSE PRACTITIONER

## 2025-03-24 PROCEDURE — 1126F AMNT PAIN NOTED NONE PRSNT: CPT | Performed by: NURSE PRACTITIONER

## 2025-03-24 PROCEDURE — 1159F MED LIST DOCD IN RCRD: CPT | Performed by: NURSE PRACTITIONER

## 2025-03-24 NOTE — PROGRESS NOTES
Procedure     ECG 12 Lead    Date/Time: 3/24/2025 1:53 PM  Performed by: Sandy Cm APRN    Authorized by: Sandy Cm APRN  Comparison: compared with previous ECG from 3/16/2017  Rhythm: sinus rhythm  Rate: normal  BPM: 61    Clinical impression: normal ECG

## 2025-03-24 NOTE — PROGRESS NOTES
CC:   Chief Complaint   Patient presents with    Dizziness     X2 days    Blurred Vision     X2 days last 3-4 mintues each time and thirsty.    Headache        History:  Amanda Kennedy is a 39 y.o. female who presents today for evaluation of the above problems.      HPI    Patient presents with a few concerns. Over the past few days has had intermittent dizziness where the room is spinning (no N/V), intermittent blurred vision that lasts a few minutes, headaches described as sharp pain and excessive thirst. Feels like she is drinking constantly. No illness. Denies any change in medications. Fatigued. She reports at night she has had chest pressure to left side of chest with tingling sensation to left arm- unsure if heart related or anxiety related.     Has high cholesterol- will order lipoprotein A - has family history of high cholesterol. States she was trying to eat healthier diet and her cholesterol went up.       Allergies   Allergen Reactions    Sulfa Antibiotics Anaphylaxis    Flagyl [Metronidazole] Unknown - Low Severity     Unknown reatcion    Risperidone And Related Myalgia     Past Medical History:   Diagnosis Date    Anxiety     Asthma     Basal cell carcinoma of cheek 2016    RIGHT CHEEK    Depression     GERD (gastroesophageal reflux disease)     Herpes     Hypoglycemia     Nutcracker esophagus     Pregnant     PTSD (post-traumatic stress disorder)     Scarring of skin     Trauma     ptsd     Past Surgical History:   Procedure Laterality Date    BREAST SURGERY      augmentation     SECTION      2     SECTION Bilateral 2017    Procedure:  SECTION REPEAT WITHOUT TUBAL LIGATION;  Surgeon: Kaleigh Brown MD;  Location: [facility] LABOR DELIVERY;  Service:     CHOLECYSTECTOMY      MOLE REMOVAL      6    WISDOM TOOTH EXTRACTION       History reviewed. No pertinent family history.   reports that she quit smoking about 4 years ago. Her smoking use included cigarettes.  "She started smoking about 16 years ago. She has a 12 pack-year smoking history. She has been exposed to tobacco smoke. She has never used smokeless tobacco. She reports that she does not drink alcohol and does not use drugs.      Current Outpatient Medications:     diazePAM (VALIUM) 5 MG tablet, Take 1 tablet by mouth Daily As Needed for Anxiety., Disp: , Rfl:     sertraline (ZOLOFT) 100 MG tablet, Take 1.5 tablets by mouth Daily. (Patient taking differently: Take 1 tablet by mouth Daily.), Disp: , Rfl:     folic acid-vit B6-vit B12 (FOLBEE) 2.5-25-1 MG tablet tablet, Take  by mouth Daily. (Patient not taking: Reported on 3/24/2025), Disp: , Rfl:     MAGNESIUM PO, Take 2 tablets by mouth Every Night. (Patient not taking: Reported on 3/24/2025), Disp: , Rfl:     PROGESTERONE PO, Take 100 mg by mouth Daily. Compounded medication - 100 mg/ml 1 ml daily (Patient not taking: Reported on 3/24/2025), Disp: , Rfl:     TESTOSTERONE COMPOUNDING KIT TD, Compound 10 mg/ml 1-4 drops daily (Patient not taking: Reported on 3/24/2025), Disp: , Rfl:     OBJECTIVE:  /78 (BP Location: Left arm, Patient Position: Sitting, Cuff Size: Adult)   Pulse (!) 18   Temp 97.6 °F (36.4 °C) (Temporal)   Resp 18   Ht 167.6 cm (66\")   Wt 59.9 kg (132 lb)   SpO2 98%   BMI 21.31 kg/m²    Estimated body mass index is 21.31 kg/m² as calculated from the following:    Height as of this encounter: 167.6 cm (66\").    Weight as of this encounter: 59.9 kg (132 lb).   BMI is within normal parameters. No other follow-up for BMI required.             Physical Exam  Vitals reviewed.   Constitutional:       General: She is not in acute distress.     Appearance: Normal appearance.   HENT:      Head: Normocephalic and atraumatic.      Right Ear: Tympanic membrane, ear canal and external ear normal.      Left Ear: Tympanic membrane, ear canal and external ear normal.      Mouth/Throat:      Mouth: Mucous membranes are moist.      Pharynx: Oropharynx is " clear.   Cardiovascular:      Rate and Rhythm: Normal rate and regular rhythm.      Heart sounds: Normal heart sounds. No murmur heard.  Pulmonary:      Effort: No respiratory distress.      Breath sounds: Normal breath sounds. No wheezing.   Abdominal:      General: Bowel sounds are normal.      Palpations: Abdomen is soft.   Musculoskeletal:         General: Normal range of motion.      Cervical back: Normal range of motion.   Lymphadenopathy:      Cervical: No cervical adenopathy.   Skin:     General: Skin is warm and dry.   Neurological:      Mental Status: She is alert and oriented to person, place, and time.   Psychiatric:         Mood and Affect: Mood normal.         Behavior: Behavior normal.              Assessment/Plan    Diagnoses and all orders for this visit:    1. Dizziness (Primary)  -     ECG 12 Lead  -     CBC & Differential  -     Hemoglobin A1c  -     Comprehensive Metabolic Panel  -     Iron Profile  -     Ferritin  -     Vitamin B12    2. Left chest pressure  -     ECG 12 Lead  -     Troponin T    3. Arm numbness left  -     ECG 12 Lead  -     Comprehensive Metabolic Panel  -     Vitamin B12    4. Excessive thirst  -     CBC & Differential  -     Hemoglobin A1c  -     Comprehensive Metabolic Panel    5. Acute nonintractable headache, unspecified headache type    6. Fatigue, unspecified type  -     CBC & Differential  -     Comprehensive Metabolic Panel  -     Iron Profile  -     Ferritin  -     Vitamin B12    7. Pure hypercholesterolemia  -     Lipoprotein A (LPA)    8. Low ferritin  -     Iron Profile  -     Ferritin      Labs to evaluate further. If experiences chest pain again- encouraged to to go to ER  Encouraged healthy diet and good hydration.   If labs are unremarkable and symptoms continue- may need to do head imaging          An After Visit Summary was printed and given to the patient at discharge.  Return if symptoms worsen or fail to improve.

## 2025-03-25 LAB
ALBUMIN SERPL-MCNC: 4.4 G/DL (ref 3.9–4.9)
ALP SERPL-CCNC: 59 IU/L (ref 44–121)
ALT SERPL-CCNC: 25 IU/L (ref 0–32)
AST SERPL-CCNC: 24 IU/L (ref 0–40)
BASOPHILS # BLD AUTO: 0 X10E3/UL (ref 0–0.2)
BASOPHILS NFR BLD AUTO: 1 %
BILIRUB SERPL-MCNC: 0.3 MG/DL (ref 0–1.2)
BUN SERPL-MCNC: 14 MG/DL (ref 6–20)
BUN/CREAT SERPL: 24 (ref 9–23)
CALCIUM SERPL-MCNC: 9 MG/DL (ref 8.7–10.2)
CHLORIDE SERPL-SCNC: 105 MMOL/L (ref 96–106)
CO2 SERPL-SCNC: 22 MMOL/L (ref 20–29)
CREAT SERPL-MCNC: 0.58 MG/DL (ref 0.57–1)
EGFRCR SERPLBLD CKD-EPI 2021: 119 ML/MIN/1.73
EOSINOPHIL # BLD AUTO: 0.1 X10E3/UL (ref 0–0.4)
EOSINOPHIL NFR BLD AUTO: 1 %
ERYTHROCYTE [DISTWIDTH] IN BLOOD BY AUTOMATED COUNT: 12.9 % (ref 11.7–15.4)
FERRITIN SERPL-MCNC: 11 NG/ML (ref 15–150)
GLOBULIN SER CALC-MCNC: 2.3 G/DL (ref 1.5–4.5)
GLUCOSE SERPL-MCNC: 90 MG/DL (ref 70–99)
HBA1C MFR BLD: 5.5 % (ref 4.8–5.6)
HCT VFR BLD AUTO: 38.6 % (ref 34–46.6)
HGB BLD-MCNC: 12.8 G/DL (ref 11.1–15.9)
IMM GRANULOCYTES # BLD AUTO: 0 X10E3/UL (ref 0–0.1)
IMM GRANULOCYTES NFR BLD AUTO: 0 %
IRON SATN MFR SERPL: 12 % (ref 15–55)
IRON SERPL-MCNC: 46 UG/DL (ref 27–159)
LPA SERPL-SCNC: 109.2 NMOL/L
LYMPHOCYTES # BLD AUTO: 1.4 X10E3/UL (ref 0.7–3.1)
LYMPHOCYTES NFR BLD AUTO: 32 %
MCH RBC QN AUTO: 30.5 PG (ref 26.6–33)
MCHC RBC AUTO-ENTMCNC: 33.2 G/DL (ref 31.5–35.7)
MCV RBC AUTO: 92 FL (ref 79–97)
MONOCYTES # BLD AUTO: 0.2 X10E3/UL (ref 0.1–0.9)
MONOCYTES NFR BLD AUTO: 5 %
NEUTROPHILS # BLD AUTO: 2.6 X10E3/UL (ref 1.4–7)
NEUTROPHILS NFR BLD AUTO: 61 %
PLATELET # BLD AUTO: 175 X10E3/UL (ref 150–450)
POTASSIUM SERPL-SCNC: 4 MMOL/L (ref 3.5–5.2)
PROT SERPL-MCNC: 6.7 G/DL (ref 6–8.5)
RBC # BLD AUTO: 4.2 X10E6/UL (ref 3.77–5.28)
SODIUM SERPL-SCNC: 138 MMOL/L (ref 134–144)
TIBC SERPL-MCNC: 396 UG/DL (ref 250–450)
TROPONIN T SERPL HS-MCNC: <6 NG/L (ref 0–14)
UIBC SERPL-MCNC: 350 UG/DL (ref 131–425)
VIT B12 SERPL-MCNC: 749 PG/ML (ref 232–1245)
WBC # BLD AUTO: 4.2 X10E3/UL (ref 3.4–10.8)

## 2025-04-14 ENCOUNTER — TELEPHONE (OUTPATIENT)
Dept: FAMILY MEDICINE CLINIC | Facility: CLINIC | Age: 40
End: 2025-04-14
Payer: MEDICAID

## 2025-04-14 RX ORDER — ROSUVASTATIN CALCIUM 5 MG/1
5 TABLET, COATED ORAL NIGHTLY
Qty: 90 TABLET | Refills: 1 | Status: SHIPPED | OUTPATIENT
Start: 2025-04-14

## 2025-04-14 NOTE — TELEPHONE ENCOUNTER
Caller: Amanda Kennedy    Relationship to patient: Self    Best call back number: [phone]     Patient is needing: atorvastatin (LIPITOR) 20 MG tablet IS MAKING HER TOO TIRED. IS THERE ANYTHING ELSE SHE CAN TAKE? SHE HEARD OF AN INJECTABLE ONE. PLEASE ADVISE

## 2025-04-14 NOTE — TELEPHONE ENCOUNTER
I will change her to rosuvastatin. That one may be better tolerated. So stop the atorvastatin and start on the new one I sent to pharmacy

## 2025-04-16 ENCOUNTER — TELEPHONE (OUTPATIENT)
Dept: FAMILY MEDICINE CLINIC | Facility: CLINIC | Age: 40
End: 2025-04-16
Payer: MEDICAID

## 2025-04-16 NOTE — TELEPHONE ENCOUNTER
Caller: Amanda Kennedy    Relationship: Self    Best call back number:[phone]     What is the best time to reach you: ANYTIME     Who are you requesting to speak with (clinical staff, provider,  specific staff member): CLINICAL     Do you know the name of the person who called: CLINICAL     What was the call regarding: SHE STATES THE MEDICATION FOR HER STATIN MEDICATION HAS SULFA IN IT AND SHE IS ALLERIC TO IT     Is it okay if the provider responds through [employer]: CALL BACK REQUEST      PHARMACY  [facility] [address] [place] [address] 62 [facility] [phone] [facility] [phone]  [phone]

## 2025-04-16 NOTE — TELEPHONE ENCOUNTER
Has she started taking it yet? It has a different type of sulfa in it and typically not related to sulfa allergies in patients who are allergic to Bactrim. I really think she is fine to take it, but if any reaction- stop medication

## 2025-04-17 NOTE — TELEPHONE ENCOUNTER
Patient already threw crestor in trash and she never took one.  She said she started back on lipitor

## 2025-04-24 NOTE — TELEPHONE ENCOUNTER
Pt called stating she has not heard anything else regarding the new statin medication being sent, she does not want to take rosuvastatin due to it making her extremely tired, body aches and bad dreams. Also, she states she was sure the medication was a sulfa drug and she states she is allergic.

## 2025-04-25 RX ORDER — PRAVASTATIN SODIUM 20 MG
20 TABLET ORAL DAILY
Qty: 90 TABLET | Refills: 1 | Status: SHIPPED | OUTPATIENT
Start: 2025-04-25

## 2025-04-25 NOTE — ADDENDUM NOTE
Addended by: AIDEN FORD on: 4/25/2025 07:48 AM     Modules accepted: Orders     Pt denies SI.  Pt stopped taking wellbutrin.  [facility].  Resume home medications.

## 2025-04-25 NOTE — TELEPHONE ENCOUNTER
I'm sorry.. I thought she was going to start back on the lipitor based on the messages. I will send in pravastatin for her

## 2025-06-13 ENCOUNTER — OFFICE VISIT (OUTPATIENT)
Dept: FAMILY MEDICINE CLINIC | Facility: CLINIC | Age: 40
End: 2025-06-13
Payer: MEDICAID

## 2025-06-13 VITALS
HEART RATE: 70 BPM | TEMPERATURE: 98 F | SYSTOLIC BLOOD PRESSURE: 108 MMHG | HEIGHT: 66 IN | WEIGHT: 135.6 LBS | BODY MASS INDEX: 21.79 KG/M2 | DIASTOLIC BLOOD PRESSURE: 68 MMHG | OXYGEN SATURATION: 98 %

## 2025-06-13 DIAGNOSIS — F41.1 GENERALIZED ANXIETY DISORDER: ICD-10-CM

## 2025-06-13 DIAGNOSIS — E61.1 LOW IRON: ICD-10-CM

## 2025-06-13 DIAGNOSIS — K21.9 GASTROESOPHAGEAL REFLUX DISEASE, UNSPECIFIED WHETHER ESOPHAGITIS PRESENT: ICD-10-CM

## 2025-06-13 DIAGNOSIS — E78.00 PURE HYPERCHOLESTEROLEMIA: ICD-10-CM

## 2025-06-13 DIAGNOSIS — Z00.00 WELLNESS EXAMINATION: Primary | ICD-10-CM

## 2025-06-13 DIAGNOSIS — R79.0 LOW FERRITIN: ICD-10-CM

## 2025-06-13 RX ORDER — OMEPRAZOLE 20 MG/1
20 CAPSULE, DELAYED RELEASE ORAL DAILY
Qty: 30 CAPSULE | Refills: 2 | Status: SHIPPED | OUTPATIENT
Start: 2025-06-13

## 2025-06-13 RX ORDER — BENZTROPINE MESYLATE 0.5 MG/1
0.5 TABLET ORAL 2 TIMES DAILY
COMMUNITY
Start: 2025-06-10

## 2025-06-13 RX ORDER — ZONISAMIDE 25 MG/1
25 CAPSULE ORAL DAILY
COMMUNITY
Start: 2025-05-14

## 2025-06-13 RX ORDER — DIAZEPAM 2 MG/1
2 TABLET ORAL DAILY
COMMUNITY

## 2025-06-13 NOTE — PROGRESS NOTES
CC:   Chief Complaint   Patient presents with    Annual Exam     Fasting.  Patient sees GYN        History:  Amanda Kennedy is a 40 y.o. female who presents today for evaluation of the above problems.      HPI  Patient presents for wellness exam. She is fasting for blood work. Reports she is concerned about cholesterol and that causes anxiety more for her now knowing it is high. She is asking for other tests to evaluate for risks to help calm her nerves. Discussed coronary calcium score test and patient is wanting that done.     Mental health- patient goes to psychiatry routinely as well as counseling weekly. She has found she has sensory issues and that triggers her anxiety more. She is interested in finding a counselor to help with sensory problems - encouraged her to ask her psychiatry about that. Goes to psych in .     Patient goes to GYN and they do her pap smears.     Patient has noticed some difficulty with swallowing at times and some DEBBIE area pain. Discussed acid reflux. Will treat with omeprazole but if worsens or continues- f/u      Allergies   Allergen Reactions    Sulfa Antibiotics Anaphylaxis    Flagyl [Metronidazole] Unknown - Low Severity     Unknown reatcion    Risperidone And Related Myalgia     Past Medical History:   Diagnosis Date    Anxiety     Asthma     Basal cell carcinoma of cheek 2016    RIGHT CHEEK    Depression     GERD (gastroesophageal reflux disease)     Herpes     Hypoglycemia     Nutcracker esophagus     Pregnant     PTSD (post-traumatic stress disorder)     Scarring of skin     Trauma     ptsd     Past Surgical History:   Procedure Laterality Date    BREAST SURGERY      augmentation     SECTION      2     SECTION Bilateral 2017    Procedure:  SECTION REPEAT WITHOUT TUBAL LIGATION;  Surgeon: Kaleigh Brown MD;  Location: [facility] LABOR DELIVERY;  Service:     CHOLECYSTECTOMY      MOLE REMOVAL      6    WISDOM TOOTH EXTRACTION    "    History reviewed. No pertinent family history.   reports that she quit smoking about 4 years ago. Her smoking use included cigarettes. She started smoking about 16 years ago. She has a 12 pack-year smoking history. She has been exposed to tobacco smoke. She has never used smokeless tobacco. She reports that she does not drink alcohol and does not use drugs.      Current Outpatient Medications:     diazePAM (VALIUM) 2 MG tablet, Take 1 tablet by mouth Daily., Disp: , Rfl:     folic acid-vit B6-vit B12 (FOLBEE) 2.5-25-1 MG tablet tablet, Take  by mouth Daily., Disp: , Rfl:     MAGNESIUM PO, Take 2 tablets by mouth Every Night., Disp: , Rfl:     pravastatin (PRAVACHOL) 20 MG tablet, Take 1 tablet by mouth Daily., Disp: 90 tablet, Rfl: 1    sertraline (ZOLOFT) 100 MG tablet, Take 1.5 tablets by mouth Daily. (Patient taking differently: Take 1 tablet by mouth Daily.), Disp: , Rfl:     zonisamide (ZONEGRAN) 25 MG capsule, Take 1 capsule by mouth Daily., Disp: , Rfl:     benztropine (COGENTIN) 0.5 MG tablet, Take 1 tablet by mouth 2 (Two) Times a Day. (Patient not taking: Reported on 6/13/2025), Disp: , Rfl:     omeprazole (priLOSEC) 20 MG capsule, Take 1 capsule by mouth Daily., Disp: 30 capsule, Rfl: 2    PROGESTERONE PO, Take 100 mg by mouth Daily. Compounded medication - 100 mg/ml 1 ml daily (Patient not taking: Reported on 6/13/2025), Disp: , Rfl:     TESTOSTERONE COMPOUNDING KIT TD, Compound 10 mg/ml 1-4 drops daily (Patient not taking: Reported on 6/13/2025), Disp: , Rfl:     OBJECTIVE:  /68 (BP Location: Left arm, Patient Position: Sitting, Cuff Size: Adult)   Pulse 70   Temp 98 °F (36.7 °C) (Temporal)   Ht 167.6 cm (66\")   Wt 61.5 kg (135 lb 9.6 oz)   SpO2 98%   BMI 21.89 kg/m²    Estimated body mass index is 21.89 kg/m² as calculated from the following:    Height as of this encounter: 167.6 cm (66\").    Weight as of this encounter: 61.5 kg (135 lb 9.6 oz).   BMI is within normal parameters. No " other follow-up for BMI required.             Physical Exam  Vitals reviewed.   Constitutional:       General: She is not in acute distress.     Appearance: Normal appearance.   HENT:      Head: Normocephalic and atraumatic.      Right Ear: Tympanic membrane, ear canal and external ear normal.      Left Ear: Tympanic membrane, ear canal and external ear normal.      Mouth/Throat:      Mouth: Mucous membranes are moist.      Pharynx: Oropharynx is clear.   Eyes:      Pupils: Pupils are equal, round, and reactive to light.   Cardiovascular:      Rate and Rhythm: Normal rate and regular rhythm.      Heart sounds: Normal heart sounds.   Pulmonary:      Effort: No respiratory distress.      Breath sounds: Normal breath sounds. No wheezing.   Abdominal:      General: Bowel sounds are normal. There is no distension.      Palpations: Abdomen is soft.      Tenderness: There is abdominal tenderness (DEBBIE area).   Musculoskeletal:         General: Normal range of motion.      Cervical back: Normal range of motion.   Lymphadenopathy:      Cervical: No cervical adenopathy.   Skin:     General: Skin is warm and dry.   Neurological:      Mental Status: She is alert and oriented to person, place, and time.   Psychiatric:         Mood and Affect: Mood normal.         Behavior: Behavior normal.         Thought Content: Thought content normal.              Assessment/Plan    Diagnoses and all orders for this visit:    1. Wellness examination (Primary)    2. Pure hypercholesterolemia  -     Lipid Panel  -     Comprehensive Metabolic Panel  -     CT Cardiac Calcium Score Without Dye; Future    3. Low iron  -     Iron  -     Ferritin    4. Low ferritin  -     Iron  -     Ferritin    5. Generalized anxiety disorder    6. Gastroesophageal reflux disease, unspecified whether esophagitis present  -     omeprazole (priLOSEC) 20 MG capsule; Take 1 capsule by mouth Daily.  Dispense: 30 capsule; Refill: 2    Encouraged heart healthy diet and  exercise. Patient states she mainly like red meat and unhealthy foods- trying to do better.   Continue counseling and discuss with them other options to help with sensory concerns            An After Visit Summary was printed and given to the patient at discharge.  Return in about 6 months (around 12/13/2025) for Recheck.

## 2025-06-14 LAB
ALBUMIN SERPL-MCNC: 4.5 G/DL (ref 3.9–4.9)
ALP SERPL-CCNC: 63 IU/L (ref 44–121)
ALT SERPL-CCNC: 27 IU/L (ref 0–32)
AST SERPL-CCNC: 21 IU/L (ref 0–40)
BILIRUB SERPL-MCNC: 0.3 MG/DL (ref 0–1.2)
BUN SERPL-MCNC: 12 MG/DL (ref 6–20)
BUN/CREAT SERPL: 17 (ref 9–23)
CALCIUM SERPL-MCNC: 8.8 MG/DL (ref 8.7–10.2)
CHLORIDE SERPL-SCNC: 103 MMOL/L (ref 96–106)
CHOLEST SERPL-MCNC: 214 MG/DL (ref 100–199)
CO2 SERPL-SCNC: 20 MMOL/L (ref 20–29)
CREAT SERPL-MCNC: 0.69 MG/DL (ref 0.57–1)
EGFRCR SERPLBLD CKD-EPI 2021: 113 ML/MIN/1.73
FERRITIN SERPL-MCNC: 10 NG/ML (ref 15–150)
GLOBULIN SER CALC-MCNC: 2.4 G/DL (ref 1.5–4.5)
GLUCOSE SERPL-MCNC: 90 MG/DL (ref 70–99)
HDLC SERPL-MCNC: 83 MG/DL
IRON SERPL-MCNC: 53 UG/DL (ref 27–159)
LDLC SERPL CALC-MCNC: 121 MG/DL (ref 0–99)
POTASSIUM SERPL-SCNC: 3.9 MMOL/L (ref 3.5–5.2)
PROT SERPL-MCNC: 6.9 G/DL (ref 6–8.5)
SODIUM SERPL-SCNC: 139 MMOL/L (ref 134–144)
TRIGL SERPL-MCNC: 56 MG/DL (ref 0–149)
VLDLC SERPL CALC-MCNC: 10 MG/DL (ref 5–40)

## 2025-07-24 ENCOUNTER — TELEPHONE (OUTPATIENT)
Dept: PSYCHIATRY | Facility: CLINIC | Age: 40
End: 2025-07-24

## 2025-07-24 NOTE — TELEPHONE ENCOUNTER
Unable to reach patient via phone, sent my chart message in regards to no show appointment.    Price (Do Not Change): 0.00 Detail Level: Simple Instructions: This plan will send the code FBSE to the PM system.  DO NOT or CHANGE the price.

## 2025-07-30 ENCOUNTER — OFFICE VISIT (OUTPATIENT)
Dept: FAMILY MEDICINE CLINIC | Facility: CLINIC | Age: 40
End: 2025-07-30
Payer: MEDICAID

## 2025-07-30 VITALS
HEART RATE: 75 BPM | SYSTOLIC BLOOD PRESSURE: 97 MMHG | HEIGHT: 66 IN | BODY MASS INDEX: 22.18 KG/M2 | TEMPERATURE: 98 F | DIASTOLIC BLOOD PRESSURE: 62 MMHG | OXYGEN SATURATION: 98 % | WEIGHT: 138 LBS

## 2025-07-30 DIAGNOSIS — F90.2 ATTENTION DEFICIT HYPERACTIVITY DISORDER, COMBINED TYPE: Primary | ICD-10-CM

## 2025-07-30 DIAGNOSIS — F60.3: ICD-10-CM

## 2025-07-30 DIAGNOSIS — R53.83 FATIGUE, UNSPECIFIED TYPE: ICD-10-CM

## 2025-07-30 DIAGNOSIS — F31.9 BIPOLAR AFFECTIVE DISORDER, REMISSION STATUS UNSPECIFIED: ICD-10-CM

## 2025-07-30 DIAGNOSIS — F13.20 SEDATIVE DEPENDENCE: ICD-10-CM

## 2025-07-30 PROCEDURE — 99214 OFFICE O/P EST MOD 30 MIN: CPT | Performed by: NURSE PRACTITIONER

## 2025-07-30 PROCEDURE — 1126F AMNT PAIN NOTED NONE PRSNT: CPT | Performed by: NURSE PRACTITIONER

## 2025-07-30 PROCEDURE — 1160F RVW MEDS BY RX/DR IN RCRD: CPT | Performed by: NURSE PRACTITIONER

## 2025-07-30 PROCEDURE — 1159F MED LIST DOCD IN RCRD: CPT | Performed by: NURSE PRACTITIONER

## 2025-07-30 NOTE — PROGRESS NOTES
CC:   Chief Complaint   Patient presents with    Anemia     Patient concerned may need iron infusions.  Has lab order from psychiatry dr        History:  Amanda Kennedy is a 40 y.o. female who presents today for evaluation of the above problems.      HPI    Patient presents today with order from psychiatry for blood work.  States they are evaluating her for sensory processing disorders. She sees Bala Gutierres NP in [place]. Has difficulty driving- more anxious- has been on Valium and trying to figure out what could be cause for symptoms.  I will put in lab work per order for her psych NP.  Patient has follow-up with psychiatry in 2 weeks.  Patient states she has a lot of fatigue, craving ice and feels short of air at times.  Patient mention she may need an iron infusion.  Reviewed past labs and reassured patient that she does not need iron infusion at this time.  Will call with blood work results.    Allergies   Allergen Reactions    Sulfa Antibiotics Anaphylaxis    Flagyl [Metronidazole] Unknown - Low Severity     Unknown reatcion    Risperidone And Paliperidone Myalgia     Past Medical History:   Diagnosis Date    Anxiety     Asthma     Basal cell carcinoma of cheek 2016    RIGHT CHEEK    Depression     GERD (gastroesophageal reflux disease)     Herpes     Hypoglycemia     Nutcracker esophagus     Pregnant     PTSD (post-traumatic stress disorder)     Scarring of skin     Trauma     ptsd     Past Surgical History:   Procedure Laterality Date    BREAST SURGERY      augmentation     SECTION      2     SECTION Bilateral 2017    Procedure:  SECTION REPEAT WITHOUT TUBAL LIGATION;  Surgeon: Kaleigh Brown MD;  Location: [facility] LABOR DELIVERY;  Service:     CHOLECYSTECTOMY      MOLE REMOVAL      6    WISDOM TOOTH EXTRACTION       History reviewed. No pertinent family history.   reports that she quit smoking about 4 years ago. Her smoking use included cigarettes.  "She started smoking about 16 years ago. She has a 12 pack-year smoking history. She has been exposed to tobacco smoke. She has never used smokeless tobacco. She reports that she does not drink alcohol and does not use drugs.      Current Outpatient Medications:     diazePAM (VALIUM) 2 MG tablet, Take 1 tablet by mouth Daily., Disp: , Rfl:     omeprazole (priLOSEC) 20 MG capsule, Take 1 capsule by mouth Daily., Disp: 30 capsule, Rfl: 2    pravastatin (PRAVACHOL) 40 MG tablet, Take 1 tablet by mouth Daily., Disp: 90 tablet, Rfl: 1    sertraline (ZOLOFT) 100 MG tablet, Take 1.5 tablets by mouth Daily. (Patient taking differently: Take 1 tablet by mouth Daily.), Disp: , Rfl:     zonisamide (ZONEGRAN) 25 MG capsule, Take 1 capsule by mouth Daily., Disp: , Rfl:     benztropine (COGENTIN) 0.5 MG tablet, Take 1 tablet by mouth 2 (Two) Times a Day. (Patient not taking: Reported on 7/30/2025), Disp: , Rfl:     folic acid-vit B6-vit B12 (FOLBEE) 2.5-25-1 MG tablet tablet, Take  by mouth Daily. (Patient not taking: Reported on 7/30/2025), Disp: , Rfl:     MAGNESIUM PO, Take 2 tablets by mouth Every Night. (Patient not taking: Reported on 7/30/2025), Disp: , Rfl:     OBJECTIVE:  BP 97/62 (BP Location: Left arm, Patient Position: Sitting, Cuff Size: Adult)   Pulse 75   Temp 98 °F (36.7 °C) (Temporal)   Ht 167.6 cm (66\")   Wt 62.6 kg (138 lb)   SpO2 98%   BMI 22.27 kg/m²    Estimated body mass index is 22.27 kg/m² as calculated from the following:    Height as of this encounter: 167.6 cm (66\").    Weight as of this encounter: 62.6 kg (138 lb).   BMI is within normal parameters. No other follow-up for BMI required.             Physical Exam  Vitals reviewed.   Constitutional:       General: She is not in acute distress.     Appearance: Normal appearance.   HENT:      Head: Normocephalic and atraumatic.   Cardiovascular:      Rate and Rhythm: Normal rate and regular rhythm.      Heart sounds: Normal heart sounds.   Pulmonary: "      Effort: No respiratory distress.      Breath sounds: Normal breath sounds. No wheezing.   Musculoskeletal:         General: Normal range of motion.   Skin:     General: Skin is warm and dry.   Neurological:      Mental Status: She is alert and oriented to person, place, and time.   Psychiatric:         Mood and Affect: Mood normal.         Behavior: Behavior normal.              Assessment/Plan    Diagnoses and all orders for this visit:    1. Attention deficit hyperactivity disorder, combined type (Primary)  -     CBC & Differential  -     Comprehensive Metabolic Panel  -     Hemoglobin A1c  -     Vitamin D,25-Hydroxy  -     Vitamin B12  -     TSH  -     T4, Free  -     T3, free  -     Prolactin    2. Sedative dependence  -     CBC & Differential  -     Comprehensive Metabolic Panel  -     Hemoglobin A1c  -     Vitamin D,25-Hydroxy  -     Vitamin B12  -     TSH  -     T4, Free  -     T3, free  -     Prolactin    3. Bipolar affective disorder, remission status unspecified  -     CBC & Differential  -     Comprehensive Metabolic Panel  -     Hemoglobin A1c  -     Vitamin D,25-Hydroxy  -     Vitamin B12  -     TSH  -     T4, Free  -     T3, free  -     Prolactin    4. Emotional instability (excessive)  -     CBC & Differential  -     Comprehensive Metabolic Panel  -     Hemoglobin A1c  -     Vitamin D,25-Hydroxy  -     Vitamin B12  -     TSH  -     T4, Free  -     T3, free  -     Prolactin    5. Fatigue, unspecified type      Labs placed per psych order form.  Will send them records once resulted.  Their fax number is [phone].  Encouraged patient to continue close follow-up with psych.          An After Visit Summary was printed and given to the patient at discharge.  Return if symptoms worsen or fail to improve.

## 2025-07-31 LAB
25(OH)D3+25(OH)D2 SERPL-MCNC: 52.6 NG/ML (ref 30–100)
ALBUMIN SERPL-MCNC: 4.3 G/DL (ref 3.9–4.9)
ALP SERPL-CCNC: 56 IU/L (ref 44–121)
ALT SERPL-CCNC: 18 IU/L (ref 0–32)
AST SERPL-CCNC: 17 IU/L (ref 0–40)
BASOPHILS # BLD AUTO: 0 X10E3/UL (ref 0–0.2)
BASOPHILS NFR BLD AUTO: 1 %
BILIRUB SERPL-MCNC: <0.2 MG/DL (ref 0–1.2)
BUN SERPL-MCNC: 12 MG/DL (ref 6–20)
BUN/CREAT SERPL: 20 (ref 9–23)
CALCIUM SERPL-MCNC: 8.8 MG/DL (ref 8.7–10.2)
CHLORIDE SERPL-SCNC: 105 MMOL/L (ref 96–106)
CO2 SERPL-SCNC: 20 MMOL/L (ref 20–29)
CREAT SERPL-MCNC: 0.61 MG/DL (ref 0.57–1)
EGFRCR SERPLBLD CKD-EPI 2021: 117 ML/MIN/1.73
EOSINOPHIL # BLD AUTO: 0.1 X10E3/UL (ref 0–0.4)
EOSINOPHIL NFR BLD AUTO: 3 %
ERYTHROCYTE [DISTWIDTH] IN BLOOD BY AUTOMATED COUNT: 12.6 % (ref 11.7–15.4)
GLOBULIN SER CALC-MCNC: 2.1 G/DL (ref 1.5–4.5)
GLUCOSE SERPL-MCNC: 89 MG/DL (ref 70–99)
HBA1C MFR BLD: 5.2 % (ref 4.8–5.6)
HCT VFR BLD AUTO: 37.8 % (ref 34–46.6)
HGB BLD-MCNC: 12.6 G/DL (ref 11.1–15.9)
IMM GRANULOCYTES # BLD AUTO: 0 X10E3/UL (ref 0–0.1)
IMM GRANULOCYTES NFR BLD AUTO: 0 %
LYMPHOCYTES # BLD AUTO: 1.1 X10E3/UL (ref 0.7–3.1)
LYMPHOCYTES NFR BLD AUTO: 31 %
MCH RBC QN AUTO: 30.5 PG (ref 26.6–33)
MCHC RBC AUTO-ENTMCNC: 33.3 G/DL (ref 31.5–35.7)
MCV RBC AUTO: 92 FL (ref 79–97)
MONOCYTES # BLD AUTO: 0.2 X10E3/UL (ref 0.1–0.9)
MONOCYTES NFR BLD AUTO: 6 %
NEUTROPHILS # BLD AUTO: 2.1 X10E3/UL (ref 1.4–7)
NEUTROPHILS NFR BLD AUTO: 59 %
PLATELET # BLD AUTO: 185 X10E3/UL (ref 150–450)
POTASSIUM SERPL-SCNC: 4.1 MMOL/L (ref 3.5–5.2)
PROLACTIN SERPL-MCNC: 9.1 NG/ML (ref 4.8–33.4)
PROT SERPL-MCNC: 6.4 G/DL (ref 6–8.5)
RBC # BLD AUTO: 4.13 X10E6/UL (ref 3.77–5.28)
SODIUM SERPL-SCNC: 139 MMOL/L (ref 134–144)
T3FREE SERPL-MCNC: 2.8 PG/ML (ref 2–4.4)
T4 FREE SERPL-MCNC: 0.96 NG/DL (ref 0.82–1.77)
TSH SERPL DL<=0.005 MIU/L-ACNC: 1.43 UIU/ML (ref 0.45–4.5)
VIT B12 SERPL-MCNC: 694 PG/ML (ref 232–1245)
WBC # BLD AUTO: 3.5 X10E3/UL (ref 3.4–10.8)

## 2025-08-18 ENCOUNTER — OFFICE VISIT (OUTPATIENT)
Dept: FAMILY MEDICINE CLINIC | Facility: CLINIC | Age: 40
End: 2025-08-18
Payer: MEDICAID

## 2025-08-18 VITALS
BODY MASS INDEX: 22.21 KG/M2 | WEIGHT: 138.2 LBS | DIASTOLIC BLOOD PRESSURE: 68 MMHG | SYSTOLIC BLOOD PRESSURE: 118 MMHG | RESPIRATION RATE: 18 BRPM | HEART RATE: 83 BPM | OXYGEN SATURATION: 98 % | HEIGHT: 66 IN | TEMPERATURE: 98 F

## 2025-08-18 DIAGNOSIS — J02.9 SORE THROAT: ICD-10-CM

## 2025-08-18 DIAGNOSIS — J02.0 STREP THROAT: Primary | ICD-10-CM

## 2025-08-18 LAB
EXPIRATION DATE: ABNORMAL
EXPIRATION DATE: NORMAL
FLUAV AG UPPER RESP QL IA.RAPID: NOT DETECTED
FLUBV AG UPPER RESP QL IA.RAPID: NOT DETECTED
INTERNAL CONTROL: ABNORMAL
INTERNAL CONTROL: NORMAL
Lab: ABNORMAL
Lab: NORMAL
S PYO AG THROAT QL: POSITIVE
SARS-COV-2 AG UPPER RESP QL IA.RAPID: NOT DETECTED

## 2025-08-18 PROCEDURE — 1160F RVW MEDS BY RX/DR IN RCRD: CPT | Performed by: NURSE PRACTITIONER

## 2025-08-18 PROCEDURE — 87880 STREP A ASSAY W/OPTIC: CPT | Performed by: NURSE PRACTITIONER

## 2025-08-18 PROCEDURE — 87428 SARSCOV & INF VIR A&B AG IA: CPT | Performed by: NURSE PRACTITIONER

## 2025-08-18 PROCEDURE — 1125F AMNT PAIN NOTED PAIN PRSNT: CPT | Performed by: NURSE PRACTITIONER

## 2025-08-18 PROCEDURE — 1159F MED LIST DOCD IN RCRD: CPT | Performed by: NURSE PRACTITIONER

## 2025-08-18 PROCEDURE — 99213 OFFICE O/P EST LOW 20 MIN: CPT | Performed by: NURSE PRACTITIONER

## 2025-08-18 RX ORDER — AMOXICILLIN 500 MG/1
1000 CAPSULE ORAL DAILY
Qty: 20 CAPSULE | Refills: 0 | Status: SHIPPED | OUTPATIENT
Start: 2025-08-18 | End: 2025-08-28

## 2025-08-25 ENCOUNTER — TELEPHONE (OUTPATIENT)
Dept: FAMILY MEDICINE CLINIC | Facility: CLINIC | Age: 40
End: 2025-08-25
Payer: MEDICAID

## (undated) DEVICE — APPL CHLORAPREP W/TINT 26ML ORNG

## (undated) DEVICE — SUT VIC 0 CT1 36IN J946H

## (undated) DEVICE — DRSNG BRDR MEPILEX P/OP SIL 4X12IN

## (undated) DEVICE — SLV SCD KN ADJ EXPRSS LG

## (undated) DEVICE — Device

## (undated) DEVICE — ADHS LIQ MASTISOL 2/3ML

## (undated) DEVICE — 3M™ STERI-STRIP™ REINFORCED ADHESIVE SKIN CLOSURES, R1547, 1/2 IN X 4 IN (12 MM X 100 MM), 6 STRIPS/ENVELOPE: Brand: 3M™ STERI-STRIP™

## (undated) DEVICE — CONMED GOLDLINE ELECTROSURGICAL HANDPIECE, HAND CONTROLLED WITH ULTRACLEAN BLADE ELECTRODE, BUTTON SWITCH, SAFETY HOLSTER AND 10' (3 M) CABLE: Brand: CONMED GOLDLINE

## (undated) DEVICE — GLV SURG TRIUMPH ORTHO W/ALOE PF LTX 7.0